# Patient Record
Sex: FEMALE | Race: WHITE | NOT HISPANIC OR LATINO | Employment: FULL TIME | URBAN - METROPOLITAN AREA
[De-identification: names, ages, dates, MRNs, and addresses within clinical notes are randomized per-mention and may not be internally consistent; named-entity substitution may affect disease eponyms.]

---

## 2024-02-07 ENCOUNTER — TRANSCRIBE ORDERS (OUTPATIENT)
Dept: OBSTETRICS AND GYNECOLOGY | Facility: HOSPITAL | Age: 34
End: 2024-02-07
Payer: COMMERCIAL

## 2024-02-07 DIAGNOSIS — Z34.90 PREGNANCY, UNSPECIFIED GESTATIONAL AGE: ICD-10-CM

## 2024-02-07 DIAGNOSIS — O34.31: Primary | ICD-10-CM

## 2024-02-07 DIAGNOSIS — Z87.59 HISTORY OF PRETERM PREMATURE RUPTURE OF MEMBRANES (PPROM): ICD-10-CM

## 2024-02-08 ENCOUNTER — OFFICE VISIT (OUTPATIENT)
Dept: OBSTETRICS AND GYNECOLOGY | Facility: HOSPITAL | Age: 34
End: 2024-02-08
Payer: COMMERCIAL

## 2024-02-08 ENCOUNTER — HOSPITAL ENCOUNTER (OUTPATIENT)
Dept: WOMENS IMAGING | Facility: HOSPITAL | Age: 34
Discharge: HOME | End: 2024-02-08
Admitting: OBSTETRICS & GYNECOLOGY
Payer: COMMERCIAL

## 2024-02-08 VITALS
HEIGHT: 67 IN | SYSTOLIC BLOOD PRESSURE: 136 MMHG | BODY MASS INDEX: 42.85 KG/M2 | DIASTOLIC BLOOD PRESSURE: 71 MMHG | WEIGHT: 273 LBS

## 2024-02-08 DIAGNOSIS — E72.12 METHYLENETETRAHYDROFOLATE REDUCTASE (MTHFR) DEFICIENCY AFFECTING PREGNANCY, ANTEPARTUM: ICD-10-CM

## 2024-02-08 DIAGNOSIS — O99.280 METHYLENETETRAHYDROFOLATE REDUCTASE (MTHFR) DEFICIENCY AFFECTING PREGNANCY, ANTEPARTUM: ICD-10-CM

## 2024-02-08 DIAGNOSIS — Z34.90 PREGNANCY, UNSPECIFIED GESTATIONAL AGE: ICD-10-CM

## 2024-02-08 DIAGNOSIS — O09.219 PREVIOUS PRETERM DELIVERY, ANTEPARTUM: Primary | ICD-10-CM

## 2024-02-08 DIAGNOSIS — Z87.59 HISTORY OF PRETERM PREMATURE RUPTURE OF MEMBRANES (PPROM): ICD-10-CM

## 2024-02-08 DIAGNOSIS — N96 RECURRENT PREGNANCY LOSS: ICD-10-CM

## 2024-02-08 DIAGNOSIS — O34.31: ICD-10-CM

## 2024-02-08 PROCEDURE — 76801 OB US < 14 WKS SINGLE FETUS: CPT

## 2024-02-08 PROCEDURE — 76813 OB US NUCHAL MEAS 1 GEST: CPT

## 2024-02-08 RX ORDER — PROGESTERONE 200 MG/1
200 CAPSULE ORAL DAILY
COMMUNITY

## 2024-02-08 RX ORDER — ONDANSETRON HYDROCHLORIDE 4 MG/5ML
4 SOLUTION ORAL ONCE AS NEEDED
COMMUNITY

## 2024-02-08 RX ORDER — ENOXAPARIN SODIUM 100 MG/ML
30 INJECTION SUBCUTANEOUS DAILY
COMMUNITY

## 2024-02-08 RX ORDER — CITALOPRAM 40 MG/1
40 TABLET ORAL DAILY
COMMUNITY

## 2024-02-08 RX ORDER — PRENATAL VIT/IRON FUM/FOLIC AC 27MG-0.8MG
TABLET ORAL DAILY
COMMUNITY

## 2024-02-08 NOTE — ASSESSMENT & PLAN NOTE
Patient reports that she has an MTHFR mutation heterozygote.  No records are available to confirm this.  She has been placed on prophylactic dose Lovenox for recurrent early pregnancy loss.  At the current time the date is unclear as to the necessity for anticoagulation in the face of MTHFR heterozygote's.  I would leave the continuation of this medication up to Dr. Olmstead and the patient.  Certainly if the patient gets in the third trimester with a normally grown fetus I would consider discontinuing the medication then.

## 2024-02-08 NOTE — LETTER
2024     Nelida Olmstead MD  [address]  [facility] [address]    Patient: Ninfa Jackson   YOB: 1990   Date of Visit: 2024     Dear Nelida Olmstead MD:       Thank you for referring Ninfa Jackson to me for evaluation. Below are the relevant portions of my assessment and plan of care.    If you have questions, please do not hesitate to call me. I look forward to following Ninfa along with you.         Sincerely,        Douglas A. Milligan, MD        CC: No Recipients    Milligan, Douglas A, MD  24 [phone]  Sign when Signing Visit  Documentation of the ultrasound findings, images, and interpretations will be available in the patient's Viewpoint report which is located in the imaging tab in chart review.    Maternal/Fetal Medicine Consult Note     Name: Ninfa Jackson    : 1990     MRN: [id number]     Referring Provider: Nelida Olmstead MD    Chief Complaint  cervical funneling    Subjective     History of Present Illness:  Ninfa Jackson is a 33 y.o.  13w3d who presents today for prior pregnancy losses    SHIRIN: Estimated Date of Delivery: 24     ROS:   As noted in HPI.     History reviewed. No pertinent past medical history.   Past Surgical History:   Procedure Laterality Date   •  SECTION PRIMARY      FTP   • D & C WITH SUCTION     • D & E SECOND TRIMESTER     • DEEP NECK LYMPH NODE BIOPSY / EXCISION     • WISDOM TOOTH EXTRACTION        OB History          6    Para   2    Term   0       2    AB   3    Living   1         SAB   3    IAB   0    Ectopic   0    Molar   0    Multiple   0    Live Births   1          Obstetric Comments   FOB #1 Pregnancy #1 SAB at 8 weeks  FOB #1 Pregnancy #2 P C/S at 34 6/7 weeks, FTP, male  FOB #1 Pregnancy #3 SAB at 7 weeks,  FOB #1 Pregnancy #4 PPROM at 18 weeks, unable to deliver all of fetus, decapitated D&E done  FOB #1 Pregnancy #5 D&E at 11 week s, Trisomy 15 after  "demise  FOB #1 Pregnancy #6  current                 Objective     Vital Signs  /71   Ht 170.2 cm (67\")   Wt 124 kg (273 lb)   LMP 10/28/2023 (Approximate)   Estimated body mass index is 42.76 kg/m² as calculated from the following:    Height as of this encounter: 170.2 cm (67\").    Weight as of this encounter: 124 kg (273 lb).    Physical Exam    Ultrasound Impression:   See Viewpoint    Assessment and Plan     Ninfa Jackson is a 33 y.o.  13w3d who presents today for prior pregnancy losses    Diagnoses and all orders for this visit:    1. Previous  delivery, antepartum (Primary)  Assessment & Plan:  Spontaneous  delivery in a previous pregnancy is well documented as placing the current pregnancy at risk for prematurity.  For example, women with one previous  birth are 2-4 times more likely to deliver another  infant and women with two previous  births have a four- to sixfold increased risk for delivering  compared to multiparous women with no previous  birth. Recent studies have provided evidence for the efficacy of vaginal progesterone suppositories to prevent  birth in women with a willams gestation and the history of a previous spontaneous  birth.  The effects of progesterone on the myometrium are twofold:      It suppresses the action of estrogen by inhibiting the replacement of cytosolic estrogen receptors.    It exerts a direct effect on the biosynthetic process of the uterus through its own cellular receptor.        Women who are candidates for this therapy should have progesterone supplementation initiated between 16 and 24 weeks' gestation and continued through 36 weeks' gestation.      Patient is currently on a 200 mg dose of progesterone daily.    Orders:  -     [facility]  [facility]; Future    2. 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  No records are available to confirm this.  She has been placed on prophylactic dose Lovenox for recurrent early pregnancy loss.  At the current time the date is unclear as to the necessity for anticoagulation in the face of MTHFR heterozygote's.  I would leave the continuation of this medication up to Dr. Olmstead and the patient.  Certainly if the patient gets in the third trimester with a normally grown fetus I would consider discontinuing the medication then.    Orders:  -     [facility]  Diagnostic Center; Future    3. Pregnancy, unspecified gestational age  -     [facility]  Diagnostic Center; Future    4. Recurrent pregnancy loss  Assessment & Plan:  Spontaneous  delivery in a previous pregnancy is well documented as placing subsequent pregnancies at risk for prematurity. For example, women with one previous  birth are 2-4 times more likely to deliver another  infant and women with two previous  births have a four- to sixfold increased risk for delivering  compared to multiparous women with no previous  birth.     Classically, the term “cervical insufficiency” is used to describe painless cervical dilation leading to recurrent 2nd trimester pregnancy losses/births of otherwise normal pregnancies. Although structural cervical weakness is the source of some 2nd trimester losses/births, most are caused by other disorders such as decidual inflammation/infection, hemorrhage or uterine overdistension. These disorders, which are not typically recurrent, can initiate biochemical changes in the cervix that lead to premature cervical shortening and, often, pregnancy loss.     A history-based diagnosis of cervical insufficiency is made for women with =2 consecutive prior 2nd trimester pregnancy losses associated with relatively painless early cervical dilatation or =3 early (<34 weeks)  births in  which other causes of pregnancy loss or  birth (e.g., infection, placental bleeding, multiple gestation,  labor) have been excluded. A diagnosis of cervical insufficiency based on history, ultrasound and physical exam, however, would include women with one or two prior 2nd trimester pregnancy losses or  births and cervical length <25 mm on transvaginal ultrasound examination or advanced cervical changes on physical examination before 24 weeks of gestation.     The majority of women with suspected cervical insufficiency do not meet criteria for a history-based diagnosis of cervical insufficiency. For these patients vaginal progesterone is indicated. The effects of progesterone on the myometrium are twofold: It suppresses the action of estrogen by inhibiting the replacement of cytosolic estrogen receptors and it exerts a direct effect on the biosynthetic process of the uterus through its own cellular receptor.     [place] is a 17-OHP preparation approved in 2011 by the [place] Food and Drug Administration (FDA) to reduce the risk of recurrent  birth in pregnant people with a willams pregnancy who have a history of a prior spontaneous  delivery.      Women who are candidates for progesterone supplementation should have therapy initiated between 16 and 24 weeks' gestation and continued through 36 weeks' gestation.    Cervical length screening is usually initiated at 14 weeks, but screening may be started as early as 12 weeks in women with early 2nd trimester losses, recurrent 2nd trimester losses or a prior large cold-knife conization. Ultrasound examination is generally repeated every two weeks until 22 weeks as long as the cervical length is =2.5mm,  with the expectation that  cervical changes will precede overt  labor or membrane rupture symptoms by 3-6 weeks (Iams 2011). Cervical cerclage placement would be offered for a cervical length that decreases to less than 2.5 mm  on serial cervical evaluation. Ultrasound screening is usually discontinued at 22 weeks of gestation, as cerclage is rarely performed after this time.    Orders:  -     [facility] [place]; Future         Follow Up  Return in about 4 weeks (around 3/7/2024).    I spent 30 minutes caring for the patient on the day of service. This included: obtaining or reviewing a separately obtained medical history, reviewing patient records, performing a medically appropriate exam and/or evaluation, counseling or educating the patient/family/caregiver, ordering medications, labs, and/or procedures and documenting such in the medical record. This does not include time spent on review and interpretation of other tests such as fetal ultrasound or the performance of other procedures such as amniocentesis or CVS.      Douglas A. Milligan, MD  Maternal Fetal Medicine, [facility] [place]     2024

## 2024-02-08 NOTE — PROGRESS NOTES
Patient denies bleeding, leaking fluid or cramping  NIPT negative  Patient is to schedule appointment with Dr. Olmstead's office for next week after this appointment.

## 2024-02-08 NOTE — ASSESSMENT & PLAN NOTE
Spontaneous  delivery in a previous pregnancy is well documented as placing subsequent pregnancies at risk for prematurity. For example, women with one previous  birth are 2-4 times more likely to deliver another  infant and women with two previous  births have a four- to sixfold increased risk for delivering  compared to multiparous women with no previous  birth.     Classically, the term “cervical insufficiency” is used to describe painless cervical dilation leading to recurrent 2nd trimester pregnancy losses/births of otherwise normal pregnancies. Although structural cervical weakness is the source of some 2nd trimester losses/births, most are caused by other disorders such as decidual inflammation/infection, hemorrhage or uterine overdistension. These disorders, which are not typically recurrent, can initiate biochemical changes in the cervix that lead to premature cervical shortening and, often, pregnancy loss.     A history-based diagnosis of cervical insufficiency is made for women with ?2 consecutive prior 2nd trimester pregnancy losses associated with relatively painless early cervical dilatation or ?3 early (<34 weeks)  births in which other causes of pregnancy loss or  birth (e.g., infection, placental bleeding, multiple gestation,  labor) have been excluded. A diagnosis of cervical insufficiency based on history, ultrasound and physical exam, however, would include women with one or two prior 2nd trimester pregnancy losses or  births and cervical length <25 mm on transvaginal ultrasound examination or advanced cervical changes on physical examination before 24 weeks of gestation.     The majority of women with suspected cervical insufficiency do not meet criteria for a history-based diagnosis of cervical insufficiency. For these patients vaginal progesterone is indicated. The effects of progesterone on the myometrium are twofold: It  suppresses the action of estrogen by inhibiting the replacement of cytosolic estrogen receptors and it exerts a direct effect on the biosynthetic process of the uterus through its own cellular receptor.     [place] is a 17-OHP preparation approved in 2011 by the US Food and Drug Administration (FDA) to reduce the risk of recurrent  birth in pregnant people with a willams pregnancy who have a history of a prior spontaneous  delivery.      Women who are candidates for progesterone supplementation should have therapy initiated between 16 and 24 weeks' gestation and continued through 36 weeks' gestation.    Cervical length screening is usually initiated at 14 weeks, but screening may be started as early as 12 weeks in women with early 2nd trimester losses, recurrent 2nd trimester losses or a prior large cold-knife conization. Ultrasound examination is generally repeated every two weeks until 22 weeks as long as the cervical length is ?2.5mm,  with the expectation that  cervical changes will precede overt  labor or membrane rupture symptoms by 3-6 weeks (Iams 2011). Cervical cerclage placement would be offered for a cervical length that decreases to less than 2.5 mm on serial cervical evaluation. Ultrasound screening is usually discontinued at 22 weeks of gestation, as cerclage is rarely performed after this time.

## 2024-02-08 NOTE — ASSESSMENT & PLAN NOTE
Spontaneous  delivery in a previous pregnancy is well documented as placing the current pregnancy at risk for prematurity.  For example, women with one previous  birth are 2-4 times more likely to deliver another  infant and women with two previous  births have a four- to sixfold increased risk for delivering  compared to multiparous women with no previous  birth. Recent studies have provided evidence for the efficacy of vaginal progesterone suppositories to prevent  birth in women with a willams gestation and the history of a previous spontaneous  birth.  The effects of progesterone on the myometrium are twofold:      It suppresses the action of estrogen by inhibiting the replacement of cytosolic estrogen receptors.    It exerts a direct effect on the biosynthetic process of the uterus through its own cellular receptor.        Women who are candidates for this therapy should have progesterone supplementation initiated between 16 and 24 weeks' gestation and continued through 36 weeks' gestation.      Patient is currently on a 200 mg dose of progesterone daily.

## 2024-03-07 ENCOUNTER — HOSPITAL ENCOUNTER (OUTPATIENT)
Dept: WOMENS IMAGING | Facility: HOSPITAL | Age: 34
Discharge: HOME | End: 2024-03-07
Admitting: OBSTETRICS & GYNECOLOGY
Payer: COMMERCIAL

## 2024-03-07 ENCOUNTER — OFFICE VISIT (OUTPATIENT)
Dept: OBSTETRICS AND GYNECOLOGY | Facility: HOSPITAL | Age: 34
End: 2024-03-07
Payer: COMMERCIAL

## 2024-03-07 VITALS — DIASTOLIC BLOOD PRESSURE: 70 MMHG | WEIGHT: 280 LBS | BODY MASS INDEX: 43.85 KG/M2 | SYSTOLIC BLOOD PRESSURE: 142 MMHG

## 2024-03-07 DIAGNOSIS — N96 RECURRENT PREGNANCY LOSS: ICD-10-CM

## 2024-03-07 DIAGNOSIS — E72.12 METHYLENETETRAHYDROFOLATE REDUCTASE (MTHFR) DEFICIENCY AFFECTING PREGNANCY, ANTEPARTUM: ICD-10-CM

## 2024-03-07 DIAGNOSIS — O44.40 LOW-LYING PLACENTA: ICD-10-CM

## 2024-03-07 DIAGNOSIS — O99.280 METHYLENETETRAHYDROFOLATE REDUCTASE (MTHFR) DEFICIENCY AFFECTING PREGNANCY, ANTEPARTUM: ICD-10-CM

## 2024-03-07 DIAGNOSIS — O09.219 PREVIOUS PRETERM DELIVERY, ANTEPARTUM: ICD-10-CM

## 2024-03-07 DIAGNOSIS — N96 RECURRENT PREGNANCY LOSS: Primary | ICD-10-CM

## 2024-03-07 DIAGNOSIS — Z34.90 PREGNANCY, UNSPECIFIED GESTATIONAL AGE: ICD-10-CM

## 2024-03-07 PROCEDURE — 76815 OB US LIMITED FETUS(S): CPT

## 2024-03-07 PROCEDURE — 76817 TRANSVAGINAL US OBSTETRIC: CPT

## 2024-03-07 NOTE — ASSESSMENT & PLAN NOTE
Patient returns today for cervical length screening.  She reports no complications since her last visit here.  She reports no regular uterine contractions.    Ultrasound today demonstrates a normally grown fetus we did not perform an anatomic survey today due to early gestation on transvaginal scanning the cervical length appears to be greater than 3 cm with no funneling.  The placenta was posterior low-lying.    We will rescan the patient again in 2 weeks to once again assess cervical length as well as to perform an anatomic survey.

## 2024-03-07 NOTE — PROGRESS NOTES
Pt has an appt with Dr. Olmstead on 3/21/24. NIPT negative. Pt denies vaginal bleeding or abdominal pain. Pt has history of a primary C/S at 34.6 weeks for failure to progress and a prior D&E at approximately 11 weeks (trisomy 15).

## 2024-03-07 NOTE — PROGRESS NOTES
"Documentation of the ultrasound findings, images, and interpretations will be available in the patient's Viewpoint report which is located in the imaging tab in chart review.    Maternal/Fetal Medicine Follow Up  Note     Name: Ninfa Jackson    : 1990     MRN: [id number]     Referring Provider: Nelida Olmstead MD    Chief Complaint  hx 18 wk loss-PROM, MO    Subjective     History of Present Illness:  Ninfa Jackson is a 33 y.o.  17w3d who presents today for prior PTD     SHIRIN: Estimated Date of Delivery: 24     ROS:   As noted in HPI.     Objective     Vital Signs  /70   Wt 127 kg (280 lb)   LMP 10/28/2023 (Approximate)   Estimated body mass index is 43.85 kg/m² as calculated from the following:    Height as of 24: 170.2 cm (67\").    Weight as of this encounter: 127 kg (280 lb).    Physical Exam    Ultrasound Impression:   See Viewpoint    Assessment and Plan     Ninfa Jackson is a 33 y.o.  17w3d who presents today for prior PTD    Diagnoses and all orders for this visit:    1. Recurrent pregnancy loss (Primary)    2. Previous  delivery, antepartum  Assessment & Plan:  Patient returns today for cervical length screening.  She reports no complications since her last visit here.  She reports no regular uterine contractions.    Ultrasound today demonstrates a normally grown fetus we did not perform an anatomic survey today due to early gestation on transvaginal scanning the cervical length appears to be greater than 3 cm with no funneling.  The placenta was posterior low-lying.    We will rescan the patient again in 2 weeks to once again assess cervical length as well as to perform an anatomic survey.      3. Pregnancy, unspecified gestational age    4. Low-lying placenta         Follow Up  Return in about 2 weeks (around 3/21/2024).    I spent 10 minutes caring for the patient on the day of service. This included: obtaining or reviewing a separately obtained medical " history, reviewing patient records, performing a medically appropriate exam and/or evaluation, counseling or educating the patient/family/caregiver, ordering medications, labs, and/or procedures and documenting such in the medical record. This does not include time spent on review and interpretation of other tests such as fetal ultrasound or the performance of other procedures such as amniocentesis or CVS.      Douglas A. Milligan, MD  Maternal Fetal Medicine, [facility]     2024

## 2024-03-25 ENCOUNTER — HOSPITAL ENCOUNTER (INPATIENT)
Facility: HOSPITAL | Age: 34
LOS: 4 days | Discharge: HOME | End: 2024-03-29
Attending: OBSTETRICS & GYNECOLOGY | Admitting: OBSTETRICS & GYNECOLOGY
Payer: COMMERCIAL

## 2024-03-25 PROBLEM — O34.32 INCOMPETENT CERVIX DURING SECOND TRIMESTER, ANTEPARTUM: Status: ACTIVE | Noted: 2024-03-25

## 2024-03-25 PROBLEM — Z98.891 H/O: C-SECTION: Status: ACTIVE | Noted: 2024-03-25

## 2024-03-25 PROBLEM — E66.01 MORBID OBESITY WITH BMI OF 40.0-44.9, ADULT: Status: ACTIVE | Noted: 2024-03-25

## 2024-03-25 LAB
ABO GROUP BLD: NORMAL
ABO GROUP BLD: NORMAL
ALP SERPL-CCNC: 109 U/L (ref 39–117)
ALT SERPL W P-5'-P-CCNC: 20 U/L (ref 1–33)
AST SERPL-CCNC: 49 U/L (ref 1–32)
BASOPHILS # BLD AUTO: 0.03 10*3/MM3 (ref 0–0.2)
BASOPHILS NFR BLD AUTO: 0.3 % (ref 0–1.5)
BILIRUB SERPL-MCNC: <0.2 MG/DL (ref 0–1.2)
BILIRUB UR QL STRIP: NEGATIVE
BLD GP AB SCN SERPL QL: NEGATIVE
CLARITY UR: CLEAR
COLOR UR: YELLOW
CREAT SERPL-MCNC: 0.5 MG/DL (ref 0.57–1)
DEPRECATED RDW RBC AUTO: 42.4 FL (ref 37–54)
EOSINOPHIL # BLD AUTO: 0.04 10*3/MM3 (ref 0–0.4)
EOSINOPHIL NFR BLD AUTO: 0.4 % (ref 0.3–6.2)
ERYTHROCYTE [DISTWIDTH] IN BLOOD BY AUTOMATED COUNT: 13 % (ref 12.3–15.4)
GLUCOSE UR STRIP-MCNC: NEGATIVE MG/DL
HCT VFR BLD AUTO: 38.5 % (ref 34–46.6)
HGB BLD-MCNC: 12.9 G/DL (ref 12–15.9)
HGB UR QL STRIP.AUTO: NEGATIVE
IMM GRANULOCYTES # BLD AUTO: 0.12 10*3/MM3 (ref 0–0.05)
IMM GRANULOCYTES NFR BLD AUTO: 1.3 % (ref 0–0.5)
KETONES UR QL STRIP: NEGATIVE
LDH SERPL-CCNC: 387 U/L (ref 135–214)
LEUKOCYTE ESTERASE UR QL STRIP.AUTO: NEGATIVE
LYMPHOCYTES # BLD AUTO: 1.82 10*3/MM3 (ref 0.7–3.1)
LYMPHOCYTES NFR BLD AUTO: 19.2 % (ref 19.6–45.3)
MCH RBC QN AUTO: 29.6 PG (ref 26.6–33)
MCHC RBC AUTO-ENTMCNC: 33.5 G/DL (ref 31.5–35.7)
MCV RBC AUTO: 88.3 FL (ref 79–97)
MONOCYTES # BLD AUTO: 0.75 10*3/MM3 (ref 0.1–0.9)
MONOCYTES NFR BLD AUTO: 7.9 % (ref 5–12)
NEUTROPHILS NFR BLD AUTO: 6.72 10*3/MM3 (ref 1.7–7)
NEUTROPHILS NFR BLD AUTO: 70.9 % (ref 42.7–76)
NITRITE UR QL STRIP: NEGATIVE
NRBC BLD AUTO-RTO: 0 /100 WBC (ref 0–0.2)
PH UR STRIP.AUTO: 6 [PH] (ref 5–8)
PLATELET # BLD AUTO: 363 10*3/MM3 (ref 140–450)
PMV BLD AUTO: 10 FL (ref 6–12)
PROT UR QL STRIP: NEGATIVE
RBC # BLD AUTO: 4.36 10*6/MM3 (ref 3.77–5.28)
RH BLD: POSITIVE
RH BLD: POSITIVE
SP GR UR STRIP: 1.01 (ref 1–1.03)
T PALLIDUM IGG SER QL: NORMAL
T&S EXPIRATION DATE: NORMAL
URATE SERPL-MCNC: 4.2 MG/DL (ref 2.4–5.7)
UROBILINOGEN UR QL STRIP: NORMAL
WBC NRBC COR # BLD AUTO: 9.48 10*3/MM3 (ref 3.4–10.8)

## 2024-03-25 PROCEDURE — 86901 BLOOD TYPING SEROLOGIC RH(D): CPT | Performed by: OBSTETRICS & GYNECOLOGY

## 2024-03-25 PROCEDURE — 25010000002 CEFAZOLIN PER 500 MG: Performed by: OBSTETRICS & GYNECOLOGY

## 2024-03-25 PROCEDURE — 84550 ASSAY OF BLOOD/URIC ACID: CPT | Performed by: OBSTETRICS & GYNECOLOGY

## 2024-03-25 PROCEDURE — 25810000003 LACTATED RINGERS PER 1000 ML: Performed by: OBSTETRICS & GYNECOLOGY

## 2024-03-25 PROCEDURE — 84460 ALANINE AMINO (ALT) (SGPT): CPT | Performed by: OBSTETRICS & GYNECOLOGY

## 2024-03-25 PROCEDURE — 25010000002 CLINDAMYCIN 900 MG/50ML SOLUTION: Performed by: OBSTETRICS & GYNECOLOGY

## 2024-03-25 PROCEDURE — 86901 BLOOD TYPING SEROLOGIC RH(D): CPT

## 2024-03-25 PROCEDURE — 82247 BILIRUBIN TOTAL: CPT | Performed by: OBSTETRICS & GYNECOLOGY

## 2024-03-25 PROCEDURE — 25810000003 LACTATED RINGERS SOLUTION: Performed by: OBSTETRICS & GYNECOLOGY

## 2024-03-25 PROCEDURE — 25010000002 GENTAMICIN PER 80 MG: Performed by: OBSTETRICS & GYNECOLOGY

## 2024-03-25 PROCEDURE — 85025 COMPLETE CBC W/AUTO DIFF WBC: CPT | Performed by: OBSTETRICS & GYNECOLOGY

## 2024-03-25 PROCEDURE — 83615 LACTATE (LD) (LDH) ENZYME: CPT | Performed by: OBSTETRICS & GYNECOLOGY

## 2024-03-25 PROCEDURE — 86900 BLOOD TYPING SEROLOGIC ABO: CPT

## 2024-03-25 PROCEDURE — 86900 BLOOD TYPING SEROLOGIC ABO: CPT | Performed by: OBSTETRICS & GYNECOLOGY

## 2024-03-25 PROCEDURE — 84450 TRANSFERASE (AST) (SGOT): CPT | Performed by: OBSTETRICS & GYNECOLOGY

## 2024-03-25 PROCEDURE — 86780 TREPONEMA PALLIDUM: CPT | Performed by: OBSTETRICS & GYNECOLOGY

## 2024-03-25 PROCEDURE — 84075 ASSAY ALKALINE PHOSPHATASE: CPT | Performed by: OBSTETRICS & GYNECOLOGY

## 2024-03-25 PROCEDURE — 86850 RBC ANTIBODY SCREEN: CPT | Performed by: OBSTETRICS & GYNECOLOGY

## 2024-03-25 PROCEDURE — 81003 URINALYSIS AUTO W/O SCOPE: CPT | Performed by: OBSTETRICS & GYNECOLOGY

## 2024-03-25 PROCEDURE — 82565 ASSAY OF CREATININE: CPT | Performed by: OBSTETRICS & GYNECOLOGY

## 2024-03-25 RX ORDER — CLINDAMYCIN PHOSPHATE 900 MG/50ML
900 INJECTION, SOLUTION INTRAVENOUS EVERY 8 HOURS
Qty: 300 ML | Refills: 0 | Status: COMPLETED | OUTPATIENT
Start: 2024-03-25 | End: 2024-03-27

## 2024-03-25 RX ORDER — SODIUM CHLORIDE 0.9 % (FLUSH) 0.9 %
10 SYRINGE (ML) INJECTION AS NEEDED
Status: DISCONTINUED | OUTPATIENT
Start: 2024-03-25 | End: 2024-03-29 | Stop reason: HOSPADM

## 2024-03-25 RX ORDER — ACETAMINOPHEN 325 MG/1
650 TABLET ORAL EVERY 4 HOURS PRN
Status: DISCONTINUED | OUTPATIENT
Start: 2024-03-25 | End: 2024-03-29 | Stop reason: HOSPADM

## 2024-03-25 RX ORDER — SODIUM CHLORIDE, SODIUM LACTATE, POTASSIUM CHLORIDE, CALCIUM CHLORIDE 600; 310; 30; 20 MG/100ML; MG/100ML; MG/100ML; MG/100ML
125 INJECTION, SOLUTION INTRAVENOUS CONTINUOUS
Status: DISCONTINUED | OUTPATIENT
Start: 2024-03-25 | End: 2024-03-29

## 2024-03-25 RX ORDER — BISACODYL 10 MG
10 SUPPOSITORY, RECTAL RECTAL DAILY PRN
Status: DISCONTINUED | OUTPATIENT
Start: 2024-03-25 | End: 2024-03-29 | Stop reason: HOSPADM

## 2024-03-25 RX ORDER — SODIUM CHLORIDE 9 MG/ML
40 INJECTION, SOLUTION INTRAVENOUS AS NEEDED
Status: DISCONTINUED | OUTPATIENT
Start: 2024-03-25 | End: 2024-03-29 | Stop reason: HOSPADM

## 2024-03-25 RX ORDER — GENTAMICIN SULFATE 60 MG/50ML
120 INJECTION, SOLUTION INTRAVENOUS EVERY 8 HOURS
Qty: 600 ML | Refills: 0 | Status: COMPLETED | OUTPATIENT
Start: 2024-03-25 | End: 2024-03-27

## 2024-03-25 RX ORDER — INDOMETHACIN 25 MG/1
25 CAPSULE ORAL EVERY 4 HOURS
Status: COMPLETED | OUTPATIENT
Start: 2024-03-25 | End: 2024-03-29

## 2024-03-25 RX ORDER — DOCUSATE SODIUM 100 MG/1
100 CAPSULE, LIQUID FILLED ORAL 2 TIMES DAILY PRN
Status: DISCONTINUED | OUTPATIENT
Start: 2024-03-25 | End: 2024-03-29 | Stop reason: HOSPADM

## 2024-03-25 RX ORDER — LIDOCAINE HYDROCHLORIDE 10 MG/ML
0.5 INJECTION, SOLUTION EPIDURAL; INFILTRATION; INTRACAUDAL; PERINEURAL ONCE AS NEEDED
Status: DISCONTINUED | OUTPATIENT
Start: 2024-03-25 | End: 2024-03-29 | Stop reason: HOSPADM

## 2024-03-25 RX ORDER — INDOMETHACIN 25 MG/1
50 CAPSULE ORAL ONCE
Qty: 2 CAPSULE | Refills: 0 | Status: COMPLETED | OUTPATIENT
Start: 2024-03-25 | End: 2024-03-25

## 2024-03-25 RX ORDER — SODIUM CHLORIDE 0.9 % (FLUSH) 0.9 %
10 SYRINGE (ML) INJECTION EVERY 12 HOURS SCHEDULED
Status: DISCONTINUED | OUTPATIENT
Start: 2024-03-25 | End: 2024-03-29 | Stop reason: HOSPADM

## 2024-03-25 RX ORDER — ONDANSETRON 2 MG/ML
4 INJECTION INTRAMUSCULAR; INTRAVENOUS EVERY 8 HOURS PRN
Status: DISCONTINUED | OUTPATIENT
Start: 2024-03-25 | End: 2024-03-29 | Stop reason: HOSPADM

## 2024-03-25 RX ORDER — ONDANSETRON 4 MG/1
8 TABLET, ORALLY DISINTEGRATING ORAL EVERY 8 HOURS PRN
Status: DISCONTINUED | OUTPATIENT
Start: 2024-03-25 | End: 2024-03-29 | Stop reason: HOSPADM

## 2024-03-25 RX ADMIN — GENTAMICIN SULFATE 120 MG: 60 INJECTION, SOLUTION INTRAVENOUS at 18:21

## 2024-03-25 RX ADMIN — SODIUM CHLORIDE, POTASSIUM CHLORIDE, SODIUM LACTATE AND CALCIUM CHLORIDE 125 ML/HR: 600; 310; 30; 20 INJECTION, SOLUTION INTRAVENOUS at 22:08

## 2024-03-25 RX ADMIN — CEFAZOLIN 1000 MG: 1 INJECTION, POWDER, FOR SOLUTION INTRAMUSCULAR; INTRAVENOUS at 19:56

## 2024-03-25 RX ADMIN — SODIUM CHLORIDE, POTASSIUM CHLORIDE, SODIUM LACTATE AND CALCIUM CHLORIDE 500 ML: 600; 310; 30; 20 INJECTION, SOLUTION INTRAVENOUS at 16:45

## 2024-03-25 RX ADMIN — CLINDAMYCIN PHOSPHATE 900 MG: 900 INJECTION, SOLUTION INTRAVENOUS at 17:47

## 2024-03-25 RX ADMIN — INDOMETHACIN 50 MG: 25 CAPSULE ORAL at 16:15

## 2024-03-25 RX ADMIN — INDOMETHACIN 25 MG: 25 CAPSULE ORAL at 19:56

## 2024-03-25 NOTE — H&P
"[facility]  Obstetric History and Physical    Referring Provider: Nelida Olmstead MD      Cc: Short cervical length    Subjective     Patient is a 33 y.o. female  currently at 20w0d, who presents as a transfer from [facility] for evaluation of possible incompetent cervix.  Presented to primary OB today with complaint of  back pain and menstrual cramping.  Ultrasound revealed short cervix with funneling.  Cervical exam noted be 1 cm per Dr. Olmstead.  Patient denies leaking of fluid, vaginal bleeding, recent trauma, urinary symptoms, fever, any other concerns.  Most recent PDC ultrasound at 18 weeks  revealed normal cervical length.  Obstetrical history significant for pre-PROM at 18 weeks requiring a D&E after failed spontaneous , primary  34 weeks 6 days due to failure to progress, and a first trimester D&E 11 weeks due to aneuploidy.  Patient currently denies abdominal pain any other concerns.        The following portions of the patients history were reviewed and updated as appropriate: current medications, allergies, past medical history, past surgical history, past family history, past social history, and problem list .       Prenatal Information:   Maternal Prenatal Labs  Blood Type No results found for: \"ABO\"   Rh Status No results found for: \"RH\"   Antibody Screen No results found for: \"ABSCRN\"   Gonnorhea No results found for: \"GCCX\"   Chlamydia No results found for: \"CLAMYDCU\"   RPR No results found for: \"RPR\"   Syphilis Antibody No results found for: \"SYPHILIS\"   Rubella No results found for: \"RUBELLAIGGIN\"   Hepatitis B Surface Antigen No results found for: \"HEPBSAG\"   HIV-1 Antibody No results found for: \"LABHIV1\"   Hepatitis C Antibody No results found for: \"HEPCAB\"   Rapid Urin Drug Screen No results found for: \"AMPMETHU\", \"BARBITSCNUR\", \"LABBENZSCN\", \"LABMETHSCN\", \"LABOPIASCN\", \"THCURSCR\", \"COCAINEUR\", \"AMPHETSCREEN\", \"PROPOXSCN\", \"BUPRENORSCNU\", \"METAMPSCNUR\", " "\"OXYCODONESCN\", \"TRICYCLICSCN\"   Group B Strep Culture No results found for: \"GBSANTIGEN\"           External Prenatal Results       Pregnancy Outside Results - Transcribed From Office Records - See Scanned Records For Details       Test Value Date Time    ABO       Rh       Antibody Screen ^ NEG  20 1346    Varicella IgG       Rubella ^ POSITIVE  20 1346    Hgb ^ 13.6 g/dL 22 1656    Hct ^ 41.8 % 22 1656    Glucose Fasting GTT       Glucose Tolerance Test 1 hour       Glucose Tolerance Test 3 hour       Gonorrhea (discrete)       Chlamydia (discrete)       RPR ^ Non-Reactive  20 1346    VDRL       Syphilis Antibody       HBsAg ^ NEGATIVE  20 1346    Herpes Simplex Virus PCR       Herpes Simplex VIrus Culture       HIV       Hep C RNA Quant PCR       Hep C Antibody       AFP       Group B Strep       GBS Susceptibility to Clindamycin       GBS Susceptibility to Erythromycin       Fetal Fibronectin       Genetic Testing, Maternal Blood                 Drug Screening       Test Value Date Time    Urine Drug Screen       Amphetamine Screen       Barbiturate Screen       Benzodiazepine Screen       Methadone Screen       Phencyclidine Screen       Opiates Screen       THC Screen       Cocaine Screen       Propoxyphene Screen       Buprenorphine Screen       Methamphetamine Screen       Oxycodone Screen       Tricyclic Antidepressants Screen                 Legend    ^: Historical                              Past OB History:       OB History    Para Term  AB Living   6 2 0 2 3 1   SAB IAB Ectopic Molar Multiple Live Births   3 0 0 0 0 1      # Outcome Date GA Lbr Kris/2nd Weight Sex Type Anes PTL Lv   6 Current            5 SAB 2023 11w0d   M SAB      4  20 18w2d   F    FD   3 SAB 2019 7w0d    SAB      2  18 34w6d   M CS-Unspec EPI  ADY      Complications: Failure to Progress in Second Stage   1 SAB 2017 8w0d    SAB         Obstetric " Comments   FOB #1 Pregnancy #1 SAB at 8 weeks   FOB #1 Pregnancy #2 P C/S at 34 6/7 weeks, FTP, male   FOB #1 Pregnancy #3 SAB at 7 weeks,   FOB #1 Pregnancy #4 PPROM at 18 weeks, unable to deliver all of fetus, decapitated D&E done   FOB #1 Pregnancy #5 D&E at 11 weeks, Trisomy 15 after demise   FOB #1 Pregnancy #6  current       Past Medical History: Past Medical History:   Diagnosis Date   • Anxiety    • Depression    • Hyperemesis gravidarum       Past Surgical History Past Surgical History:   Procedure Laterality Date   •  SECTION PRIMARY  2018    FTP   • D & C WITH SUCTION     • D & E SECOND TRIMESTER     • DEEP NECK LYMPH NODE BIOPSY / EXCISION     • WISDOM TOOTH EXTRACTION        Family History: No family history on file.   Social History:  reports that she has never smoked. She has never used smokeless tobacco.   reports that she does not currently use alcohol.   reports no history of drug use.                   General ROS Negative Findings:Headaches, Visual Changes, Epigastric pain, Anorexia, Nausia/Vomiting, ROM, and Vaginal Bleeding    ROS     All other systems have been reviewed and are neg  Objective       Vital Signs Range for the last 24 hours  Temperature: Temp:  [98.6 °F (37 °C)] 98.6 °F (37 °C)   Temp Source: Temp src: Oral   BP: BP: (131)/(79) 131/79   Pulse: Heart Rate:  [90] 90   Respirations: Resp:  [18] 18   SPO2: SpO2:  [97 %] 97 %   O2 Amount (l/min):     O2 Devices     Weight: Weight:  [127 kg (280 lb)] 127 kg (280 lb)     Physical Examination:   General:   alert, appears stated age, and cooperative   Skin:   normal   HEENT:     Lungs:   clear to auscultation bilaterally   Heart:   regular rate and rhythm, S1, S2 normal, no murmur, click, rub or gallop   Gastrointestinal: Abdomen soft, gravid uterus nontender, no guarding, no rebound negative CVA tenderness.   Lower Extremities: No edema, no calf tenderness   : Exam deferred.   Musculoskeletal:     Neuro: No  focal deficits noted.             Fetal Heart Rate Assessment   Method:     Beats/min:     Baseline:     Varibility:     Accels:     Decels:     Tracing Category:       Uterine Assessment   Method:     Frequency (min):     Ctx Count in 10 min:     Duration:     Intensity:     Intensity by IUPC:     Resting Tone:     Resting Tone by IUPC:     [place] Units:       Laboratory Results:   Lab Results (last 24 hours)       Procedure Component Value Units Date/Time    T Pallidum Antibody w/ reflex RPR (Syphilis) [[id number]] Collected: 24    Specimen: Blood Updated: 24    Preeclampsia Panel [[id number]] Collected: 24    Specimen: Blood Updated: 24    CBC & Differential [[id number]] Collected: 24    Specimen: Blood Updated: 24    Narrative:      The following orders were created for panel order CBC & Differential.  Procedure                               Abnormality         Status                     ---------                               -----------         ------                     CBC Auto Differential[[id number]]                            In process                   Please view results for these tests on the individual orders.    CBC Auto Differential [[id number]] Collected: 24    Specimen: Blood Updated: 24          Radiology Review:   Imaging Results (Last 24 Hours)       ** No results found for the last 24 hours. 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  Intrauterine pregnancy at 20w0d weeks gestation with reassuring fetal status.    2.  Incompetent cervix versus  labor  3.  No evidence of acute infection.  4.   History of  x 1 at 34 weeks 6 days  5.  History of D&E 11 and 18 weeks due to aneuploidy and failed spontaneous AB at 18 weeks gestation  6.  MTHFR heterozygous  7.  Obesity BMI 43.85  8.  AST slightly elevated at 49 most likely DUNNE  Plan:  1.  Admit to antepartum unit, continuous toco, Indocin tocolysis, antibiotics, n.p.o. after midnight, PDC ultrasound in a.m. possible cerclage  2. Plan of care has been reviewed with patient.  3.  Risks, benefits of treatment plan have been discussed.  4.  All questions have been answered.  5      Aung Rajan, DO  3/25/2024  16:57 EDT

## 2024-03-25 NOTE — PLAN OF CARE
Problem: Adult Inpatient Plan of Care  Goal: Plan of Care Review  Outcome: Ongoing, Progressing  Flowsheets (Taken 3/25/2024 1727)  Progress: improving  Plan of Care Reviewed With:   patient   spouse   Goal Outcome Evaluation:  Plan of Care Reviewed With: patient, spouse        Progress: improving       Patient denies bleeding, leakage of fluid but does report intermittent cramping and low back pain since Saturday.  FHR check done with  bpm.

## 2024-03-25 NOTE — LETTER
March 29, 2024     Patient: Ninfa Jackson   YOB: 1990   Date of Visit: 3/25/2024       To Whom It May Concern:    It is my medical opinion that Ninfa Jackson may return to work per Yaritza Echevarria MD/MFNIRMAL beginning on Monday April 1st.            Sincerely,    Sabrina Short RN

## 2024-03-26 ENCOUNTER — APPOINTMENT (OUTPATIENT)
Dept: WOMENS IMAGING | Facility: HOSPITAL | Age: 34
End: 2024-03-26
Payer: COMMERCIAL

## 2024-03-26 ENCOUNTER — ANESTHESIA EVENT (OUTPATIENT)
Dept: LABOR AND DELIVERY | Facility: HOSPITAL | Age: 34
End: 2024-03-26
Payer: COMMERCIAL

## 2024-03-26 ENCOUNTER — ANESTHESIA (OUTPATIENT)
Dept: LABOR AND DELIVERY | Facility: HOSPITAL | Age: 34
End: 2024-03-26
Payer: COMMERCIAL

## 2024-03-26 PROCEDURE — 25810000003 LACTATED RINGERS PER 1000 ML: Performed by: OBSTETRICS & GYNECOLOGY

## 2024-03-26 PROCEDURE — 76817 TRANSVAGINAL US OBSTETRIC: CPT

## 2024-03-26 PROCEDURE — 25010000002 CLINDAMYCIN 900 MG/50ML SOLUTION: Performed by: OBSTETRICS & GYNECOLOGY

## 2024-03-26 PROCEDURE — 25010000002 GENTAMICIN PER 80 MG: Performed by: OBSTETRICS & GYNECOLOGY

## 2024-03-26 PROCEDURE — 99233 SBSQ HOSP IP/OBS HIGH 50: CPT | Performed by: OBSTETRICS & GYNECOLOGY

## 2024-03-26 PROCEDURE — 76811 OB US DETAILED SNGL FETUS: CPT

## 2024-03-26 PROCEDURE — 76811 OB US DETAILED SNGL FETUS: CPT | Performed by: OBSTETRICS & GYNECOLOGY

## 2024-03-26 PROCEDURE — 76817 TRANSVAGINAL US OBSTETRIC: CPT | Performed by: OBSTETRICS & GYNECOLOGY

## 2024-03-26 PROCEDURE — 25010000002 CEFAZOLIN PER 500 MG: Performed by: OBSTETRICS & GYNECOLOGY

## 2024-03-26 RX ORDER — CITALOPRAM 40 MG/1
40 TABLET ORAL DAILY
Status: DISCONTINUED | OUTPATIENT
Start: 2024-03-26 | End: 2024-03-26

## 2024-03-26 RX ORDER — CITALOPRAM 40 MG/1
40 TABLET ORAL NIGHTLY
Status: DISCONTINUED | OUTPATIENT
Start: 2024-03-26 | End: 2024-03-29 | Stop reason: HOSPADM

## 2024-03-26 RX ORDER — PRENATAL VIT/IRON FUM/FOLIC AC 27MG-0.8MG
1 TABLET ORAL DAILY
Status: DISCONTINUED | OUTPATIENT
Start: 2024-03-26 | End: 2024-03-26

## 2024-03-26 RX ORDER — PRENATAL VIT/IRON FUM/FOLIC AC 27MG-0.8MG
1 TABLET ORAL NIGHTLY
Status: DISCONTINUED | OUTPATIENT
Start: 2024-03-26 | End: 2024-03-29 | Stop reason: HOSPADM

## 2024-03-26 RX ADMIN — CEFAZOLIN 1000 MG: 1 INJECTION, POWDER, FOR SOLUTION INTRAMUSCULAR; INTRAVENOUS at 20:54

## 2024-03-26 RX ADMIN — CEFAZOLIN 1000 MG: 1 INJECTION, POWDER, FOR SOLUTION INTRAMUSCULAR; INTRAVENOUS at 05:27

## 2024-03-26 RX ADMIN — GENTAMICIN SULFATE 120 MG: 60 INJECTION, SOLUTION INTRAVENOUS at 18:35

## 2024-03-26 RX ADMIN — CLINDAMYCIN PHOSPHATE 900 MG: 900 INJECTION, SOLUTION INTRAVENOUS at 17:41

## 2024-03-26 RX ADMIN — PRENATAL VITAMINS-IRON FUMARATE 27 MG IRON-FOLIC ACID 0.8 MG TABLET 1 TABLET: at 20:54

## 2024-03-26 RX ADMIN — Medication 5000 UNITS: at 20:54

## 2024-03-26 RX ADMIN — INDOMETHACIN 25 MG: 25 CAPSULE ORAL at 20:54

## 2024-03-26 RX ADMIN — CITALOPRAM HYDROBROMIDE 40 MG: 40 TABLET ORAL at 20:54

## 2024-03-26 RX ADMIN — INDOMETHACIN 25 MG: 25 CAPSULE ORAL at 12:23

## 2024-03-26 RX ADMIN — INDOMETHACIN 25 MG: 25 CAPSULE ORAL at 05:27

## 2024-03-26 RX ADMIN — SODIUM CHLORIDE, POTASSIUM CHLORIDE, SODIUM LACTATE AND CALCIUM CHLORIDE 125 ML/HR: 600; 310; 30; 20 INJECTION, SOLUTION INTRAVENOUS at 20:54

## 2024-03-26 RX ADMIN — GENTAMICIN SULFATE 120 MG: 60 INJECTION, SOLUTION INTRAVENOUS at 02:11

## 2024-03-26 RX ADMIN — CLINDAMYCIN PHOSPHATE 900 MG: 900 INJECTION, SOLUTION INTRAVENOUS at 02:11

## 2024-03-26 RX ADMIN — Medication 10 ML: at 20:55

## 2024-03-26 RX ADMIN — GENTAMICIN SULFATE 120 MG: 60 INJECTION, SOLUTION INTRAVENOUS at 10:34

## 2024-03-26 RX ADMIN — INDOMETHACIN 25 MG: 25 CAPSULE ORAL at 00:16

## 2024-03-26 RX ADMIN — INDOMETHACIN 25 MG: 25 CAPSULE ORAL at 08:45

## 2024-03-26 RX ADMIN — CLINDAMYCIN PHOSPHATE 900 MG: 900 INJECTION, SOLUTION INTRAVENOUS at 09:49

## 2024-03-26 RX ADMIN — INDOMETHACIN 25 MG: 25 CAPSULE ORAL at 17:41

## 2024-03-26 RX ADMIN — CEFAZOLIN 1000 MG: 1 INJECTION, POWDER, FOR SOLUTION INTRAMUSCULAR; INTRAVENOUS at 12:23

## 2024-03-26 RX ADMIN — SODIUM CHLORIDE, POTASSIUM CHLORIDE, SODIUM LACTATE AND CALCIUM CHLORIDE 125 ML/HR: 600; 310; 30; 20 INJECTION, SOLUTION INTRAVENOUS at 08:45

## 2024-03-26 NOTE — PLAN OF CARE
Problem: Adult Inpatient Plan of Care  Goal: Plan of Care Review  Outcome: Ongoing, Progressing  Flowsheets (Taken 3/26/2024 1445)  Progress: improving  Plan of Care Reviewed With:   patient   spouse  Goal: Patient-Specific Goal (Individualized)  Outcome: Ongoing, Progressing  Goal: Absence of Hospital-Acquired Illness or Injury  Outcome: Ongoing, Progressing  Intervention: Identify and Manage Fall Risk  Description: Perform standard risk assessment on admission using a validated tool or comprehensive approach appropriate to the patient; reassess fall risk frequently, with change in status or transfer to another level of care.  Communicate fall injury risk to interprofessional healthcare team.  Determine need for increased observation, equipment and environmental modification, such as low bed, signage and supportive, nonskid footwear.  Adjust safety measures to individual developmental age, stage and identified risk factors.  Reinforce the importance of safety and physical activity with patient and family.  Perform regular intentional rounding to assess need for position change, pain assessment and personal needs, including assistance with toileting.  Recent Flowsheet Documentation  Taken 3/26/2024 1442 by Sabrina hSort, RN  Safety Promotion/Fall Prevention:   safety round/check completed   clutter free environment maintained   assistive device/personal items within reach   fall prevention program maintained   nonskid shoes/slippers when out of bed  Taken 3/26/2024 1344 by Sabrina Short, RN  Safety Promotion/Fall Prevention:   safety round/check completed   assistive device/personal items within reach   clutter free environment maintained   fall prevention program maintained   nonskid shoes/slippers when out of bed  Taken 3/26/2024 1223 by Sabrina Short, RN  Safety Promotion/Fall Prevention:   safety round/check completed   clutter free environment maintained   assistive device/personal items within reach    fall prevention program maintained   nonskid shoes/slippers when out of bed  Taken 3/26/2024 1125 by Sabrina Short RN  Safety Promotion/Fall Prevention:   safety round/check completed   assistive device/personal items within reach   clutter free environment maintained   fall prevention program maintained   nonskid shoes/slippers when out of bed  Taken 3/26/2024 1030 by Sabrina Short RN  Safety Promotion/Fall Prevention:   safety round/check completed   clutter free environment maintained   assistive device/personal items within reach   fall prevention program maintained   nonskid shoes/slippers when out of bed  Taken 3/26/2024 0945 by Sabrina Short RN  Safety Promotion/Fall Prevention:   safety round/check completed   assistive device/personal items within reach   clutter free environment maintained   fall prevention program maintained   nonskid shoes/slippers when out of bed  Taken 3/26/2024 0840 by Sabrina Short RN  Safety Promotion/Fall Prevention:   safety round/check completed   assistive device/personal items within reach   clutter free environment maintained   fall prevention program maintained   nonskid shoes/slippers when out of bed  Taken 3/26/2024 0748 by Sabrina Short RN  Safety Promotion/Fall Prevention:   safety round/check completed   clutter free environment maintained   assistive device/personal items within reach   fall prevention program maintained   nonskid shoes/slippers when out of bed  Intervention: Prevent Skin Injury  Description: Perform a screening for skin injury risk, such as pressure or moisture associated skin damage on admission and at regular intervals throughout hospital stay.  Keep all areas of skin (especially folds) clean and dry.  Maintain adequate skin hydration.  Relieve and redistribute pressure and protect bony prominences; implement measures based on patient-specific risk factors.  Match turning and repositioning schedule to clinical condition.  Encourage weight  shift frequently; assist with reposition if unable to complete independently.  Float heels off bed; avoid pressure on the Achilles tendon.  Keep skin free from extended contact with medical devices.  Encourage functional activity and mobility, as early as tolerated.  Use aids (e.g., slide boards, mechanical lift) during transfer.  Recent Flowsheet Documentation  Taken 3/26/2024 0748 by Sabrina Short RN  Body Position: supine  Skin Protection:   adhesive use limited   transparent dressing maintained  Intervention: Prevent and Manage VTE (Venous Thromboembolism) Risk  Description: Assess for VTE (venous thromboembolism) risk.  Encourage and assist with early ambulation.  Initiate and maintain compression or other therapy, as indicated, based on identified risk in accordance with organizational protocol and provider order.  Encourage both active and passive leg exercises while in bed, if unable to ambulate.  Recent Flowsheet Documentation  Taken 3/26/2024 1444 by Sabrina Short RN  Activity Management:   back to bed   activity minimized  Taken 3/26/2024 1442 by Sabrina Short RN  Activity Management: up to bedside commode  Taken 3/26/2024 1349 by Sabrina Short RN  Activity Management:   back to bed   activity minimized  Taken 3/26/2024 1344 by Sabrina Short RN  Activity Management: up to bedside commode  Taken 3/26/2024 1316 by Sabrina Short RN  Activity Management:   back to bed   activity minimized  Taken 3/26/2024 1314 by Sabrina Short RN  Activity Management:   activity minimized   up to bedside commode  Taken 3/26/2024 1054 by Sabrina Short RN  Activity Management:   back to bed   activity minimized  Taken 3/26/2024 1050 by Sabrina Short RN  Activity Management: up to bedside commode  Taken 3/26/2024 0748 by Sabrina Short RN  Activity Management:   activity minimized   up to bedside commode  VTE Prevention/Management:   bilateral   sequential compression devices on  Intervention: Prevent  Infection  Description: Maintain skin and mucous membrane integrity; promote hand, oral and pulmonary hygiene.  Optimize fluid balance, nutrition, sleep and glycemic control to maximize infection resistance.  Identify potential sources of infection early to prevent or mitigate progression of infection (e.g., wound, lines, devices).  Evaluate ongoing need for invasive devices; remove promptly when no longer indicated.  Recent Flowsheet Documentation  Taken 3/26/2024 1442 by Sabrina Short RN  Infection Prevention:   personal protective equipment utilized   single patient room provided   hand hygiene promoted  Taken 3/26/2024 1344 by Sabrina Short RN  Infection Prevention:   personal protective equipment utilized   single patient room provided   hand hygiene promoted  Taken 3/26/2024 1223 by Sabrina Short RN  Infection Prevention:   personal protective equipment utilized   single patient room provided   hand hygiene promoted  Taken 3/26/2024 1125 by Sabrina Short RN  Infection Prevention:   personal protective equipment utilized   hand hygiene promoted   single patient room provided  Taken 3/26/2024 1030 by Sabrina Short RN  Infection Prevention:   personal protective equipment utilized   single patient room provided   hand hygiene promoted  Taken 3/26/2024 0945 by Sabrina Short RN  Infection Prevention: personal protective equipment utilized  Taken 3/26/2024 0840 by Sabrina Short RN  Infection Prevention:   personal protective equipment utilized   hand hygiene promoted   single patient room provided  Taken 3/26/2024 0748 by Sabrina Short RN  Infection Prevention:   personal protective equipment utilized   single patient room provided   hand hygiene promoted  Goal: Optimal Comfort and Wellbeing  Outcome: Ongoing, Progressing  Intervention: Monitor Pain and Promote Comfort  Description: Assess pain level, treatment efficacy and patient response at regular intervals using a consistent pain  scale.  Consider the presence and impact of preexisting chronic pain.  Encourage patient and caregiver involvement in pain assessment, interventions and safety measures.  Recent Flowsheet Documentation  Taken 3/26/2024 1344 by Sabrina Short RN  Pain Management Interventions:   pillow support provided   position adjusted  Taken 3/26/2024 1223 by Sabrina Short RN  Pain Management Interventions:   pillow support provided   position adjusted  Taken 3/26/2024 1125 by Sabrina Short RN  Pain Management Interventions:   pillow support provided   position adjusted  Taken 3/26/2024 1030 by Sabrina Short RN  Pain Management Interventions:   position adjusted   pillow support provided  Taken 3/26/2024 0945 by Sabrian Short RN  Pain Management Interventions:   pillow support provided   position adjusted  Taken 3/26/2024 0840 by Sabrina Short RN  Pain Management Interventions:   pillow support provided   position adjusted  Taken 3/26/2024 0748 by Sabrina Short RN  Pain Management Interventions:   position adjusted   pillow support provided  Intervention: Provide Person-Centered Care  Description: Use a family-focused approach to care.  Develop trust and rapport by proactively providing information, encouraging questions, addressing concerns and offering reassurance.  Acknowledge emotional response to hospitalization.  Recognize and utilize personal coping strategies.  Honor spiritual and cultural preferences.  Recent Flowsheet Documentation  Taken 3/26/2024 0748 by Sabrina Short RN  Trust Relationship/Rapport:   care explained   choices provided   emotional support provided   empathic listening provided   questions answered   questions encouraged   reassurance provided   thoughts/feelings acknowledged  Goal: Readiness for Transition of Care  Outcome: Ongoing, Progressing     Problem: Maternal-Fetal Wellbeing  Goal: Optimal Maternal-Fetal Wellbeing  Outcome: Ongoing, Progressing  Intervention: Support Psychosocial  Response to Complications During Pregnancy  Description: Acknowledge, normalize and validate difficulty managing major lifestyle changes.  Recognize the role maternal stress plays in pregnancy complications.  Provide opportunity for expression of concerns regarding maternal and fetal wellbeing.  Use a family-focused approach to care; emphasize importance of support system for entire family.  Discuss and prepare for risk of maternal-fetal adverse outcome.  Honor and incorporate cultural beliefs, rituals and practices.  Assist patient with stress-reduction techniques (e.g., journaling, verbalization, relaxation techniques, problem-solving).  Recognize current coping strategies; aid in developing new strategies.  Empower patient to “take control” of situation through self-care management and integration.  Monitor patient perspective regarding quality of life and pregnancy.  Assess and monitor for signs and symptoms of psychosocial concerns (e.g., depression, anxiety).  Provide information on resources for additional information.  Recent Flowsheet Documentation  Taken 3/26/2024 0748 by Sabrina Short RN  Family/Support System Care:   involvement promoted   presence promoted   support provided     Problem: Bleeding (Cervical Cerclage)  Goal: Absence of Bleeding  Outcome: Ongoing, Progressing     Problem: Bowel Motility Impaired (Cervical Cerclage)  Goal: Effective Bowel Elimination  Outcome: Ongoing, Progressing     Problem: Change in Maternal-Fetal Status (Cervical Cerclage)  Goal: Optimal Maternal and Fetal Wellbeing  Outcome: Ongoing, Progressing     Problem: Fluid and Electrolyte Imbalance (Cervical Cerclage)  Goal: Fluid and Electrolyte Balance  Outcome: Ongoing, Progressing     Problem: Infection (Cervical Cerclage)  Goal: Absence of Infection Signs and Symptoms  Outcome: Ongoing, Progressing     Problem: Ongoing Anesthesia Effects (Cervical Cerclage)  Goal: Anesthesia/Sedation Recovery  Outcome: Ongoing,  Progressing  Intervention: Optimize Anesthesia Recovery  Description: Assess and monitor airway, breathing and circulation; maintain close surveillance for deterioration.  Implement continuous monitoring, such as cardiorespiratory, blood pressure, temperature, pulse oximetry and capnography.  Elevate head of bed, if able; facilitate regular position changes.  Assess neurocognitive function and for risks that may lead to postoperative delirium, such as decreased level of consciousness, pain and agitation; offer reassurance; answer questions.  Assess and monitor neurovascular and neuromuscular function, such as motor strength, tone, posture, peripheral pulses and extremity sensation; protect areas of decreased sensation from heat, cold, medical devices or objects.  Individualize frequency and intensity of monitoring based on sedation or anesthesia administered, identified risk factors, ongoing assessment and organizational protocol.  Prepare for administration of pharmacologic therapy, such as reversal agent, antiemetic or antipruritic medication, to manage sedation or anesthesia effects.  Adjust environment to maintain safety (e.g., fall precautions, safety equipment).  Recent Flowsheet Documentation  Taken 3/26/2024 1442 by Sabrina Short, RN  Safety Promotion/Fall Prevention:   safety round/check completed   clutter free environment maintained   assistive device/personal items within reach   fall prevention program maintained   nonskid shoes/slippers when out of bed  Taken 3/26/2024 1344 by Sabrina Short, RN  Safety Promotion/Fall Prevention:   safety round/check completed   assistive device/personal items within reach   clutter free environment maintained   fall prevention program maintained   nonskid shoes/slippers when out of bed  Taken 3/26/2024 1223 by Sabrina Short, RN  Safety Promotion/Fall Prevention:   safety round/check completed   clutter free environment maintained   assistive device/personal items  within reach   fall prevention program maintained   nonskid shoes/slippers when out of bed  Taken 3/26/2024 1125 by Sabrina Short RN  Safety Promotion/Fall Prevention:   safety round/check completed   assistive device/personal items within reach   clutter free environment maintained   fall prevention program maintained   nonskid shoes/slippers when out of bed  Taken 3/26/2024 1030 by Sabrina Short RN  Safety Promotion/Fall Prevention:   safety round/check completed   clutter free environment maintained   assistive device/personal items within reach   fall prevention program maintained   nonskid shoes/slippers when out of bed  Taken 3/26/2024 0945 by Sabrina Short RN  Safety Promotion/Fall Prevention:   safety round/check completed   assistive device/personal items within reach   clutter free environment maintained   fall prevention program maintained   nonskid shoes/slippers when out of bed  Taken 3/26/2024 0840 by Sabrina Short RN  Safety Promotion/Fall Prevention:   safety round/check completed   assistive device/personal items within reach   clutter free environment maintained   fall prevention program maintained   nonskid shoes/slippers when out of bed  Taken 3/26/2024 0748 by Sabrina Short RN  Safety Promotion/Fall Prevention:   safety round/check completed   clutter free environment maintained   assistive device/personal items within reach   fall prevention program maintained   nonskid shoes/slippers when out of bed     Problem: Pain (Cervical Cerclage)  Goal: Acceptable Pain Control  Outcome: Ongoing, Progressing  Intervention: Prevent or Manage Pain  Description: Develop mutual pain management plan with patient and caregiver/family; review regularly.  Combine multimodal analgesia and nonpharmacologic strategies to help potentiate synergistic effects, enhance comfort and improve function (e.g., complementary therapy, diversional activity, mindfulness).  Evaluate risk for opioid use; individualize  pharmacologic pain management plan and titrate medication to patient response.  Provide around-the-clock dosing of pain medication to keep pain levels in control.  Manage medication-induced effects, such as respiratory depression, constipation, nausea, vomiting.  Minimize pain stimuli; coordinate care and adjust environment (e.g., light, noise, unnecessary movement); promote sleep/rest for optimal healing.  Recent Flowsheet Documentation  Taken 3/26/2024 1344 by Sabrina Short RN  Pain Management Interventions:   pillow support provided   position adjusted  Taken 3/26/2024 1223 by Sabrina Short RN  Pain Management Interventions:   pillow support provided   position adjusted  Taken 3/26/2024 1125 by Sabrina Short RN  Pain Management Interventions:   pillow support provided   position adjusted  Taken 3/26/2024 1030 by Sabrina Short RN  Pain Management Interventions:   position adjusted   pillow support provided  Taken 3/26/2024 0945 by Sabrina Short RN  Pain Management Interventions:   pillow support provided   position adjusted  Taken 3/26/2024 0840 by Sabrina Short RN  Pain Management Interventions:   pillow support provided   position adjusted  Taken 3/26/2024 0748 by Sabrina Short RN  Pain Management Interventions:   position adjusted   pillow support provided  Diversional Activities:   smartphone   television     Problem: Postoperative Nausea and Vomiting (Cervical Cerclage)  Goal: Nausea and Vomiting Relief  Outcome: Ongoing, Progressing     Problem: Postoperative Urinary Retention (Cervical Cerclage)  Goal: Effective Urinary Elimination  Outcome: Ongoing, Progressing     Problem: Respiratory Compromise (Cervical Cerclage)  Goal: Effective Oxygenation and Ventilation  Outcome: Ongoing, Progressing  Intervention: Optimize Oxygenation and Ventilation  Description: Recognize risk for obstructive sleep apnea; anticipate the need for continuous pulse oximetry and noninvasive positive pressure  ventilation.  Maintain patent airway with positioning, airway adjuncts, secretion clearance.  Encourage pulmonary hygiene, such as cough-enhancement and airway-clearance techniques, that may include use of incentive spirometry, deep breathing and cough.  Anticipate the need for splinting with cough to minimize discomfort; assist if needed.  Provide oxygen therapy judiciously, if hypoxemia present.  Consider pharmacologic therapy that may improve mucus clearance and reduce bronchospasm, laryngospasm, swelling or stridor.  Consider the need for intubation and invasive positive pressure ventilation for longer recovery needs; use lung protective measures.  Recent Flowsheet Documentation  Taken 3/26/2024 2946 by Sabrina Short, RN  Head of Bed (HOB) Positioning: HOB at 20-30 degrees   Goal Outcome Evaluation:  Plan of Care Reviewed With: patient, spouse        Progress: improving

## 2024-03-26 NOTE — CONSULTS
Maternal/Fetal Medicine Consult Note     Name: Ninfa Jackson    : 1990     MRN: [id number]     Requesting Provider: Nelida Olmstead MD    HD#: 2    Chief Complaint:  Short cervix     Subjective     History of Present Illness:  Ninfa Jackson is a 33 y.o.  20w1d admitted yesterday with short cervix and a history of 18 week PPROM.    Patient seen at primary OB office (Dr Olmstead) yesterday after two day history of low back pain. No VB. On ultrasound there noted to have funneled cervix and be dilated to 1cm. No fever reported. She was transferred to [facility]. Here started on Indocin/Cefazolin/Gent/Clindamycin.     OB history notable for prior 34 week PCS for PPROM, FTP (she reports pushing for some time then transition to section). This was followed by 18 week PPROM in which the fetus delivered partially but then underwent D&E due to incomplete Ab. She has also had several first trimester losses and a 11 week missed Ab for T15 (D&C)     She saw MFM earlier this pregnancy and was counseled to options. She has been taking vaginal progesterone since that time.     Currently reports no contractions, LOF, VB.           SHIRIN: Estimated Date of Delivery: 24     ROS:   As noted in HPI.     Past Medical History:   Diagnosis Date    Anxiety     Depression     Hyperemesis gravidarum       Past Surgical History:   Procedure Laterality Date     SECTION PRIMARY      FTP    D & C WITH SUCTION      D & E SECOND TRIMESTER      DEEP NECK LYMPH NODE BIOPSY / EXCISION      WISDOM TOOTH EXTRACTION        History reviewed. No pertinent family history.   OB History          6    Para   2    Term   0       2    AB   3    Living   1         SAB   3    IAB   0    Ectopic   0    Molar   0    Multiple   0    Live Births   1          Obstetric Comments   FOB #1 Pregnancy #1 SAB at 8 weeks  FOB #1 Pregnancy #2 P C/S at 34 6/7 weeks, FTP, male  FOB #1 Pregnancy #3 SAB at 7  "weeks,  FOB #1 Pregnancy #4 PPROM at 18 weeks, unable to deliver all of fetus, decapitated D&E done  FOB #1 Pregnancy #5 D&E at 11 week s, Trisomy 15 after demise  FOB #1 Pregnancy #6  current               Prior to Admission medications    Medication Sig Start Date End Date Taking? Authorizing Provider   citalopram (CeleXA) 40 MG tablet Take 1 tablet by mouth Daily.   Yes Santiago Hein MD   ondansetron (ZOFRAN) 4 MG/5ML solution Take 5 mL by mouth 1 (One) Time As Needed for Nausea or Vomiting.   Yes Santiago Hein MD   Prenatal Vit-Fe Fumarate-FA (prenatal vitamin 27-0.8) 27-0.8 MG tablet tablet Take  by mouth Daily.   Yes Santiago Hein MD   Progesterone (PROMETRIUM) 200 MG capsule Take 1 capsule by mouth Daily. Mail order waiting to come in to start   Yes Santiago Hein MD   vitamin D3 125 MCG (5000 UT) capsule capsule Take 1 capsule by mouth 2 (Two) Times a Day.   Yes Santiago Hein MD   Enoxaparin Sodium (LOVENOX) 30 MG/0.3ML solution prefilled syringe syringe Inject 0.3 mL under the skin into the appropriate area as directed Daily.    Santiago Hein MD          Objective     Vital Signs  /75 (BP Location: Right arm, Patient Position: Lying)   Pulse 93   Temp 97.7 °F (36.5 °C) (Oral)   Resp 16   Ht 170.2 cm (67\")   Wt 127 kg (280 lb)   LMP 10/28/2023 (Approximate)   Estimated body mass index is 43.85 kg/m² as calculated from the following:    Height as of this encounter: 170.2 cm (67\").    Weight as of this encounter: 127 kg (280 lb).    Physical Exam    NAD   Resting comfortably   Normal pulmonary effort       [place]: None     WBC   Date Value Ref Range Status   03/25/2024 9.48 3.40 - 10.80 10*3/mm3 Final     RBC   Date Value Ref Range Status   03/25/2024 4.36 3.77 - 5.28 10*6/mm3 Final     Hemoglobin   Date Value Ref Range Status   03/25/2024 12.9 12.0 - 15.9 g/dL Final     Hematocrit   Date Value Ref Range Status   03/25/2024 38.5 34.0 - 46.6 % Final "     MCV   Date Value Ref Range Status   03/25/2024 88.3 79.0 - 97.0 fL Final     MCH   Date Value Ref Range Status   03/25/2024 29.6 26.6 - 33.0 pg Final     MCHC   Date Value Ref Range Status   03/25/2024 33.5 31.5 - 35.7 g/dL Final     RDW   Date Value Ref Range Status   03/25/2024 13.0 12.3 - 15.4 % Final     RDW-SD   Date Value Ref Range Status   03/25/2024 42.4 37.0 - 54.0 fl Final     MPV   Date Value Ref Range Status   03/25/2024 10.0 6.0 - 12.0 fL Final     Platelets   Date Value Ref Range Status   03/25/2024 363 140 - 450 10*3/mm3 Final     Neutrophil %   Date Value Ref Range Status   03/25/2024 70.9 42.7 - 76.0 % Final     Lymphocyte %   Date Value Ref Range Status   03/25/2024 19.2 (L) 19.6 - 45.3 % Final     Monocyte %   Date Value Ref Range Status   03/25/2024 7.9 5.0 - 12.0 % Final     Eosinophil %   Date Value Ref Range Status   03/25/2024 0.4 0.3 - 6.2 % Final     Basophil %   Date Value Ref Range Status   03/25/2024 0.3 0.0 - 1.5 % Final     Immature Grans %   Date Value Ref Range Status   03/25/2024 1.3 (H) 0.0 - 0.5 % Final     Neutrophils, Absolute   Date Value Ref Range Status   03/25/2024 6.72 1.70 - 7.00 10*3/mm3 Final     Lymphocytes, Absolute   Date Value Ref Range Status   03/25/2024 1.82 0.70 - 3.10 10*3/mm3 Final     Monocytes, Absolute   Date Value Ref Range Status   03/25/2024 0.75 0.10 - 0.90 10*3/mm3 Final     Eosinophils, Absolute   Date Value Ref Range Status   03/25/2024 0.04 0.00 - 0.40 10*3/mm3 Final     Basophils, Absolute   Date Value Ref Range Status   03/25/2024 0.03 0.00 - 0.20 10*3/mm3 Final     Immature Grans, Absolute   Date Value Ref Range Status   03/25/2024 0.12 (H) 0.00 - 0.05 10*3/mm3 Final     nRBC   Date Value Ref Range Status   03/25/2024 0.0 0.0 - 0.2 /100 WBC Final      Lab Results   Component Value Date    BUN 8 07/28/2022    CREATININE 0.50 (L) 03/25/2024    BCR 11 07/28/2022    K 4.2 07/28/2022    CO2 28 07/28/2022    CALCIUM 8.7 07/28/2022    ALBUMIN 4.0  "2022    BILITOT <0.2 2024    AST 49 (H) 2024    ALT 20 2024      UA          3/25/2024    16:38   Urinalysis   Specific Gravity, UA 1.007    Ketones, UA Negative    Blood, UA Negative    Leukocytes, UA Negative    Nitrite, UA Negative       No results found for: \"CREATININEUR\", \"PROTEINUR\", \"UTPCR\"        Results from last 7 days   Lab Units 24  1638   WBC 10*3/mm3 9.48   HEMOGLOBIN g/dL 12.9   HEMATOCRIT % 38.5   PLATELETS 10*3/mm3 363   ALT (SGPT) U/L 20   AST (SGOT) U/L 49*   CREATININE mg/dL 0.50*   BILIRUBIN mg/dL <0.2          Lab Results   Component Value Date    PROTEINUA Negative 2024       Imaging:  See Viewpoint   Size consistent with dates.     No fetal anomalies were identified.     No fetal markers for trisomy.     On transvaginal scan, ccervix funnels to the external os.        Assessment and Plan       33 y.o.  20w1d with cervical insufficiency, failed vaginal progesterone.   No current overt evidence of labor though she did have some back pain prior to admission   No evidence of abruption   No overt evidence of chorioamnionitis though there is some sludge noted at the internal os within the amniotic sac   Discussed the possibility of amniocentesis for evaluation for subclinical chorioamnionitis. At this point, will continue to observe, continue abx, continue indocin overnight with plan for cerclage in the AM.   Discussed that should she show evidence of chorio, labor or abruption, cerclage would not be appropriate due to increased risk of maternal morbidity. Discussed that should she develop chorioamnionitis, delivery would be indicated   Patient questions, concerns addressed   Plan for exam indicated cerclage tomorrow AM   NPO after midnight     I spent 60 minutes caring for the patient on the day of service. This included: obtaining or reviewing a separately obtained medical history, reviewing patient records, performing a medically appropriate exam " and/or evaluation, counseling or educating the patient/family/caregiver, ordering medications, labs, and/or procedures and documenting such in the medical record. This does not include time spent on review and interpretation of other tests such as fetal ultrasound or the performance of other procedures such as amniocentesis or CVS.    Yaritza Echevarria MD FACOG  Maternal Fetal Medicine, [facility]     2024

## 2024-03-26 NOTE — ANESTHESIA PREPROCEDURE EVALUATION
Anesthesia Evaluation     Patient summary reviewed and Nursing notes reviewed   NPO Solid Status: > 8 hours  NPO Liquid Status: > 2 hours           Airway   Mallampati: II  TM distance: >3 FB  Neck ROM: full  Dental      Pulmonary - negative pulmonary ROS   Cardiovascular - negative cardio ROS        Neuro/Psych- negative ROS  (+) psychiatric history Anxiety and Depression  GI/Hepatic/Renal/Endo - negative ROS   (+) morbid obesity    Musculoskeletal (-) negative ROS    Abdominal    Substance History - negative use     OB/GYN negative ob/gyn ROS   (+) Pregnant        Other - negative ROS                   Anesthesia Plan    ASA 3     spinal and ITN       Anesthetic plan, risks, benefits, and alternatives have been provided, discussed and informed consent has been obtained with: patient.    Plan discussed with attending.    CODE STATUS:    Level Of Support Discussed With: Patient  Code Status (Patient has no pulse and is not breathing): CPR (Attempt to Resuscitate)  Medical Interventions (Patient has pulse or is breathing): Full Support

## 2024-03-26 NOTE — PAYOR COMM NOTE
"Ninfa Jackson (33 y.o. Female) Initial notification  Ref #[id number]    1990      Date of Birth   1990    Social Security Number       Address   [address] [place] [address] [address] [place] [address]    Home Phone   [phone]    MRN   [id number]       Synagogue   None    Marital Status                               Admission Date   3/25/24    Admission Type   Elective    Admitting Provider   Aung Rajan DO    Attending Provider   Aung Rajan DO    Department, Room/Bed   [facility] ANTEPARTUM, N332/1       Discharge Date       Discharge Disposition       Discharge Destination                                 Attending Provider: Aung Rajan DO    Allergies: Penicillins    Isolation: None   Infection: None   Code Status: CPR    Ht: 170.2 cm (67\")   Wt: 127 kg (280 lb)    Admission Cmt: None   Principal Problem: Incompetent cervix during second trimester, antepartum [O34.32]                   Active Insurance as of 3/25/2024       Primary Coverage       Payor Plan Insurance Group Employer/Plan Group    [id number] [facility]       Payor Plan Address Payor Plan Phone Number Payor Plan Fax Number Effective Dates    [address]   3/18/2018 - None Entered    [facility] [id number]         Subscriber Name Subscriber Birth Date Member ID       NINFA JACKSON 1990 [id number]                     Emergency Contacts        (Rel.) Home Phone Work Phone Mobile Phone    CARMEN JACKSON (Spouse) [phone] -- [phone]              Insurance Information                  [employer] Phone: --    Subscriber: Ninfa Jackson Subscriber#: [id number]    Group#: [id number] Precert#: [id number]             History & Physical        Aung Rajan DO at [address] [place] [address]  Obstetric History and Physical    Referring Provider: Nelida Olmstead MD      Cc: Short cervical length    Subjective    Patient is a 33 y.o. " "female  currently at 20w0d, who presents as a transfer from [facility] for evaluation of possible incompetent cervix.  Presented to primary OB today with complaint of  back pain and menstrual cramping.  Ultrasound revealed short cervix with funneling.  Cervical exam noted be 1 cm per Dr. Olmstead.  Patient denies leaking of fluid, vaginal bleeding, recent trauma, urinary symptoms, fever, any other concerns.  Most recent PDC ultrasound at 18 weeks  revealed normal cervical length.  Obstetrical history significant for pre-PROM at 18 weeks requiring a D&E after failed spontaneous , primary  34 weeks 6 days due to failure to progress, and a first trimester D&E 11 weeks due to aneuploidy.  Patient currently denies abdominal pain any other concerns.        The following portions of the patients history were reviewed and updated as appropriate: current medications, allergies, past medical history, past surgical history, past family history, past social history, and problem list .       Prenatal Information:   Maternal Prenatal Labs  Blood Type No results found for: \"ABO\"   Rh Status No results found for: \"RH\"   Antibody Screen No results found for: \"ABSCRN\"   Gonnorhea No results found for: \"GCCX\"   Chlamydia No results found for: \"CLAMYDCU\"   RPR No results found for: \"RPR\"   Syphilis Antibody No results found for: \"SYPHILIS\"   Rubella No results found for: \"RUBELLAIGGIN\"   Hepatitis B Surface Antigen No results found for: \"HEPBSAG\"   HIV-1 Antibody No results found for: \"LABHIV1\"   Hepatitis C Antibody No results found for: \"HEPCAB\"   Rapid Urin Drug Screen No results found for: \"AMPMETHU\", \"BARBITSCNUR\", \"LABBENZSCN\", \"LABMETHSCN\", \"LABOPIASCN\", \"THCURSCR\", \"COCAINEUR\", \"AMPHETSCREEN\", \"PROPOXSCN\", \"BUPRENORSCNU\", \"METAMPSCNUR\", \"OXYCODONESCN\", \"TRICYCLICSCN\"   Group B Strep Culture No results found for: \"GBSANTIGEN\"           External Prenatal Results       Pregnancy Outside Results - " Transcribed From Office Records - See Scanned Records For Details       Test Value Date Time    ABO       Rh       Antibody Screen ^ NEG  20 1346    Varicella IgG       Rubella ^ POSITIVE  20 1346    Hgb ^ 13.6 g/dL 22 1656    Hct ^ 41.8 % 22 1656    Glucose Fasting GTT       Glucose Tolerance Test 1 hour       Glucose Tolerance Test 3 hour       Gonorrhea (discrete)       Chlamydia (discrete)       RPR ^ Non-Reactive  20 1346    VDRL       Syphilis Antibody       HBsAg ^ NEGATIVE  20 1346    Herpes Simplex Virus PCR       Herpes Simplex VIrus Culture       HIV       Hep C RNA Quant PCR       Hep C Antibody       AFP       Group B Strep       GBS Susceptibility to Clindamycin       GBS Susceptibility to Erythromycin       Fetal Fibronectin       Genetic Testing, Maternal Blood                 Drug Screening       Test Value Date Time    Urine Drug Screen       Amphetamine Screen       Barbiturate Screen       Benzodiazepine Screen       Methadone Screen       Phencyclidine Screen       Opiates Screen       THC Screen       Cocaine Screen       Propoxyphene Screen       Buprenorphine Screen       Methamphetamine Screen       Oxycodone Screen       Tricyclic Antidepressants Screen                 Legend    ^: Historical                              Past OB History:       OB History    Para Term  AB Living   6 2 0 2 3 1   SAB IAB Ectopic Molar Multiple Live Births   3 0 0 0 0 1      # Outcome Date GA Lbr Kris/2nd Weight Sex Type Anes PTL Lv   6 Current            5 SAB 2023 11w0d   M SAB      4  20 18w2d   F    FD   3 SAB 2019 7w0d    SAB      2  18 34w6d   M CS-Unspec EPI  ADY      Complications: Failure to Progress in Second Stage   1 SAB 2017 8w0d    SAB         Obstetric Comments   FOB #1 Pregnancy #1 SAB at 8 weeks   FOB #1 Pregnancy #2 P C/S at 34 6/7 weeks, FTP, male   FOB #1 Pregnancy #3 SAB at 7 weeks,   FOB #1 Pregnancy #4  PPROM at 18 weeks, unable to deliver all of fetus, decapitated D&E done   FOB #1 Pregnancy #5 D&E at 11 weeks, Trisomy 15 after demise   FOB #1 Pregnancy #6  current       Past Medical History: Past Medical History:   Diagnosis Date    Anxiety     Depression     Hyperemesis gravidarum       Past Surgical History Past Surgical History:   Procedure Laterality Date     SECTION PRIMARY  2018    FTP    D & C WITH SUCTION      D & E SECOND TRIMESTER      DEEP NECK LYMPH NODE BIOPSY / EXCISION      WISDOM TOOTH EXTRACTION        Family History: No family history on file.   Social History:  reports that she has never smoked. She has never used smokeless tobacco.   reports that she does not currently use alcohol.   reports no history of drug use.                   General ROS Negative Findings:Headaches, Visual Changes, Epigastric pain, Anorexia, Nausia/Vomiting, ROM, and Vaginal Bleeding    ROS     All other systems have been reviewed and are neg  Objective      Vital Signs Range for the last 24 hours  Temperature: Temp:  [98.6 °F (37 °C)] 98.6 °F (37 °C)   Temp Source: Temp src: Oral   BP: BP: (131)/(79) 131/79   Pulse: Heart Rate:  [90] 90   Respirations: Resp:  [18] 18   SPO2: SpO2:  [97 %] 97 %   O2 Amount (l/min):     O2 Devices     Weight: Weight:  [127 kg (280 lb)] 127 kg (280 lb)     Physical Examination:   General:   alert, appears stated age, and cooperative   Skin:   normal   HEENT:     Lungs:   clear to auscultation bilaterally   Heart:   regular rate and rhythm, S1, S2 normal, no murmur, click, rub or gallop   Gastrointestinal: Abdomen soft, gravid uterus nontender, no guarding, no rebound negative CVA tenderness.   Lower Extremities: No edema, no calf tenderness   : Exam deferred.   Musculoskeletal:     Neuro: No focal deficits noted.             Fetal Heart Rate Assessment   Method:     Beats/min:     Baseline:     Varibility:     Accels:     Decels:     Tracing Category:        Uterine Assessment   Method:     Frequency (min):     Ctx Count in 10 min:     Duration:     Intensity:     Intensity by IUPC:     Resting Tone:     Resting Tone by IUPC:     [place] Units:       Laboratory Results:   Lab Results (last 24 hours)       Procedure Component Value Units Date/Time    T Pallidum Antibody w/ reflex RPR (Syphilis) [[id number]] Collected: 24    Specimen: Blood Updated: 24    Preeclampsia Panel [[id number]] Collected: 24    Specimen: Blood Updated: 24    CBC & Differential [[id number]] Collected: 24    Specimen: Blood Updated: 24    Narrative:      The following orders were created for panel order CBC & Differential.  Procedure                               Abnormality         Status                     ---------                               -----------         ------                     CBC Auto Differential[[id number]]                            In process                   Please view results for these tests on the individual orders.    CBC Auto Differential [[id number]] Collected: 24    Specimen: Blood Updated: 24          Radiology Review:   Imaging Results (Last 24 Hours)       ** No results found for the last 24 hours. 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  Intrauterine pregnancy at 20w0d weeks gestation with reassuring fetal status.    2.  Incompetent cervix versus  labor  3.  No evidence of acute infection.  4.  History of  x 1 at 34 weeks 6 days  5.  History of D&E 11 and 18 weeks due to aneuploidy and failed spontaneous AB at 18 weeks gestation  6.  MTHFR  heterozygous  7.  Obesity BMI 43.85  8.  AST slightly elevated at 49 most likely DUNNE  Plan:  1.  Admit to antepartum unit, continuous toco, Indocin tocolysis, antibiotics, n.p.o. after midnight, PDC ultrasound in a.m. possible cerclage  2. Plan of care has been reviewed with patient.  3.  Risks, benefits of treatment plan have been discussed.  4.  All questions have been answered.  5      Aung Rajan DO  3/25/2024  16:57 EDT    Electronically signed by Aung Rajan DO at 03/26/24 0804       Vital Signs (last 2 days)       Date/Time Temp Temp src Pulse Resp BP Patient Position SpO2    03/26/24 0735 97.7 (36.5) Oral 93 16 122/75 Lying --    03/26/24 0445 98.2 (36.8) Oral 96 16 117/59 Lying --    03/26/24 0024 97.6 (36.4) -- 98 16 150/82 -- --    03/25/24 1935 98.6 (37) Oral 86 16 126/69 Lying --    03/25/24 1535 98.6 (37) Oral 90 18 131/79 Sitting 97          Current Facility-Administered Medications   Medication Dose Route Frequency Provider Last Rate Last Admin    acetaminophen (TYLENOL) tablet 650 mg  650 mg Oral Q4H PRN Aung Rajan DO        bisacodyl (DULCOLAX) suppository 10 mg  10 mg Rectal Daily PRN Aung Rajan DO        ceFAZolin 1000 mg IVPB in 100 mL NS (MBP)  1,000 mg Intravenous Q8H Aung Rajan DO   1,000 mg at 03/26/24 0527    clindamycin (CLEOCIN) 900 mg in dextrose 5% 50 mL IVPB (premix)  900 mg Intravenous Q8H Aung Rajan  mL/hr at 03/26/24 0949 900 mg at 03/26/24 0949    docusate sodium (COLACE) capsule 100 mg  100 mg Oral BID PRN Aung Rajan DO        gentamicin (GARAMYCIN) IVPB 120 mg  120 mg Intravenous Q8H Aung Rajan DO   120 mg at 03/26/24 1034    indomethacin (INDOCIN) capsule 25 mg  25 mg Oral Q4H Aung Rajan DO   25 mg at 03/26/24 0845    lactated ringers bolus 1,000 mL  1,000 mL Intravenous PRN Aung Rajan DO 4,000 mL/hr at 03/25/24 1645 500 mL at 03/25/24 1645    lactated ringers infusion  125 mL/hr  Intravenous Continuous Aung Rajan,  mL/hr at 03/26/24 0845 125 mL/hr at 03/26/24 0845    lidocaine PF 1% (XYLOCAINE) injection 0.5 mL  0.5 mL Intradermal Once PRN Aung Rajan,         ondansetron ODT (ZOFRAN-ODT) disintegrating tablet 8 mg  8 mg Oral Q8H PRN Aung Rajan DO        Or    ondansetron (ZOFRAN) injection 4 mg  4 mg Intravenous Q8H PRN Aung Rajan,         sodium chloride 0.9 % flush 10 mL  10 mL Intravenous Q12H Aung Rajan, DO        sodium chloride 0.9 % flush 10 mL  10 mL Intravenous PRN Aung Rajan,         sodium chloride 0.9 % infusion 40 mL  40 mL Intravenous PRN Aung Rajan DO         Lab Results (last 48 hours)       Procedure Component Value Units Date/Time    T Pallidum Antibody w/ reflex RPR (Syphilis) [[id number]]  (Normal) Collected: 03/25/24 1638    Specimen: Blood Updated: 03/25/24 2327     Treponemal AB Total Non-Reactive    Preeclampsia Panel [[id number]]  (Abnormal) Collected: 03/25/24 1638    Specimen: Blood Updated: 03/25/24 1714     Alkaline Phosphatase 109 U/L      ALT (SGPT) 20 U/L      Comment: Specimen hemolyzed.  Result may  be falsely elevated.        AST (SGOT) 49 U/L      Comment: Specimen hemolyzed.  Result may be falsely elevated.        Creatinine 0.50 mg/dL      Total Bilirubin <0.2 mg/dL       U/L      Comment: Specimen hemolyzed.  Results may be affected.        Uric Acid 4.2 mg/dL     Urinalysis With Culture If Indicated - Urine, Clean Catch [[id number]]  (Normal) Collected: 03/25/24 1638    Specimen: Urine, Clean Catch Updated: 03/25/24 1709     Color, UA Yellow     Appearance, UA Clear     pH, UA 6.0     Specific Gravity, UA 1.007     Glucose, UA Negative     Ketones, UA Negative     Bilirubin, UA Negative     Blood, UA Negative     Protein, UA Negative     Leuk Esterase, UA Negative     Nitrite, UA Negative     Urobilinogen, UA 0.2 E.U./dL    Narrative:      In absence of clinical symptoms, the  presence of pyuria, bacteria, and/or nitrites on the urinalysis result does not correlate with infection.  Urine microscopic not indicated.    CBC & Differential [[id number]]  (Abnormal) Collected: 24    Specimen: Blood Updated: 24    Narrative:      The following orders were created for panel order CBC & Differential.  Procedure                               Abnormality         Status                     ---------                               -----------         ------                     CBC Auto Differential[[id number]]        Abnormal            Final result                 Please view results for these tests on the individual orders.    CBC Auto Differential [[id number]]  (Abnormal) Collected: 24    Specimen: Blood Updated: 24     WBC 9.48 10*3/mm3      RBC 4.36 10*6/mm3      Hemoglobin 12.9 g/dL      Hematocrit 38.5 %      MCV 88.3 fL      MCH 29.6 pg      MCHC 33.5 g/dL      RDW 13.0 %      RDW-SD 42.4 fl      MPV 10.0 fL      Platelets 363 10*3/mm3      Neutrophil % 70.9 %      Lymphocyte % 19.2 %      Monocyte % 7.9 %      Eosinophil % 0.4 %      Basophil % 0.3 %      Immature Grans % 1.3 %      Neutrophils, Absolute 6.72 10*3/mm3      Lymphocytes, Absolute 1.82 10*3/mm3      Monocytes, Absolute 0.75 10*3/mm3      Eosinophils, Absolute 0.04 10*3/mm3      Basophils, Absolute 0.03 10*3/mm3      Immature Grans, Absolute 0.12 10*3/mm3      nRBC 0.0 /100 WBC           Imaging Results (Last 48 Hours)       Procedure Component Value Units Date/Time    [facility]  Diagn[facility] [[id number]] Collected: 24     Updated: 24    Narrative:      PAT NAME: JENNIFER ROBLES  MED REC#: [id number]  BIRTH DA: [id number]  PAT GEND: F  ACCOUNT#: [id number]  PAT TYPE: I  EXAM CHEN: [id number]  REF PHYS SOCORRO VICENTE  ACCESSION [id number]      Comparison Studies  =================    The findings of this study are compared to the prior ultrasound study  dated 3/7/24      Patient Status  ============    Inpatient      Indication  ========    History of PROM at 18 weeks. History of 11 week loss with T15. MTHFR. Lovenox. History of PTD at 35 weeks. History of . MO BMI 44. Short cervix.      Maternal Assessment  ==================    Height 170 cm  Height (ft) 5 ft  Height (in) 7 in  Weight 127 kg  Weight (lb) 280 lb  BMI 43.95 kg/m²      Method  =======    Transabdominal and transvaginal ultrasound examination. View: Limited by patient body habitus. Suboptimal view: limited by fetal position      Pregnancy  =========    Josue pregnancy. Number of fetuses: 1      Dating  ======    Method of dating: based on stated SHIRIN  GA by prior assessment 20 w + 1 d  SHIRIN by prior assessment: 2024  Ultrasound examination on: 3/26/2024  GA by U/S based upon: AC, BPD, Femur, HC  GA by U/S 21 w + 1 d  SHIRIN by U/S: 2024  Previous dating: based on stated SHIRIN, selected on 2024  Agreed SHIRIN of previous datin2024  Assigned: based on stated SHIRIN, selected on 2024  Assigned GA 20 w + 1 d  Assigned SHIRIN: 2024  Pregnancy length 280 d      Fetal Biometry  ============    Standard  BPD 51.6 mm  21w 5d        95%        Hadlock    OFD 66.2 mm  22w 2d        98%        Syl    .1 mm  21w 1d        85%        Hadlock    Cerebellum tr 22.7 mm  21w 1d        95%        Hill    Nuchal fold 6.1 mm  .7 mm  20w 3d        56%        Hadlock    Femur 36.0 mm  21w 3d        83%        Hadlock    Humerus 32.7 mm  21w 0d        82%        Syl    HC / AC 1.24          92%        Hadlock     g    EFW (lb) 0 lb  EFW (oz) 14 oz  EFW by: Hadlock (BPD-HC-AC-FL)  Extended  Tibia 31.5 mm  21w 5d        92%        Syl    Fibula 34.0 mm  22w 4d        88%        Syl    Foot 38.2 mm          98%        Chitty    Radius 27.8 mm  20w 3d        57%        Syl    Ulna 30.8 mm  21w 4d        79%        Syl    Cav. septi pel. tr 4.3 mm   7.0  mm  CM 6.3 mm          85%        Nicolaides    Head / Face / Neck  Cephalic index 0.78          41%        Nicolaides    Extremities / Bony Struc  FL / BPD 0.70          44%        Hadlock    FL / HC 0.19          61%        Hadlock    FL / AC 0.24          91%        Hadlock    Other Structures   bpm      General Evaluation  ================    Cardiac activity present.  bpm.  Fetal movements present.  Presentation cephalic.  Placenta Placental site: posterior.  Umbilical cord Cord vessels: 3 vessel cord. Insertion site: placental insertion: normal.  Amniotic fluid Amount of AF: normal. MVP 4.2 cm.      Fetal Anatomy  ============    Cranium: Appears normal  Midline falx: Appears normal  Cavum septi pellucidi: Appears normal  Cerebellum: Appears normal  Cisterna magna: Appears normal  Head / Neck  Rt lateral ventricle: Appears normal  Lt lateral ventricle: Appears normal  Rt choroid plexus: Appears normal  Lt choroid plexus: Appears normal  Vermis: Appears normal  Neck: Appears normal  Nuchal fold: Appears normal  Lips: Appear normal  Profile: not examined  Nose: not examined  Face  Palate: suboptimal  Orbits: Appears normal  Lens: Normal  4-chamber view: suboptimal  RVOT view: suboptimal  LVOT view: suboptimal  Heart / Thorax  Aortic arch view: not examined  Ductal arch view: not examined  SVC: not examined  IVC: not examined  3-vessel view: Appears normal  3-vessel-trachea view: Appears normal  Rt lung: Appears normal  Lt lung: normal  Diaphragm: Appears normal  Diaphragm: Intact  Cord insertion: Appears normal  Stomach: Appears normal  Bladder: Appears normal  Abdomen  Rt kidney: normal  Lt kidney: normal  Liver: normal  Small bowel: normal  Large bowel: normal  Cervical spine: Appears normal  Thoracic spine: Appears normal  Lumbar spine: Appears normal  Sacral spine: Appears normal  Arms: Appears normal  Legs: Appears normal  Rt upper arm: Appears normal  Rt forearm: Appears normal  Rt  "hand: Appears normal  Rt fingers: suboptimal  Lt upper arm: Appears normal  Lt forearm: Appears normal  Lt hand: Appears normal  Lt fingers: suboptimal  Rt upper leg: Appears normal  Rt lower leg: Appears normal  Rt foot: Appears normal  Lt upper leg: Appears normal  Lt lower leg: Appears normal  Lt foot: Appears normal  Gender: male  Wants to know gender: yes      Maternal Structures  ================    Uterus / Cervix  Cervix: Visualized  Approach: Transvaginal  Cervical length 4.2 mm  Funneling: Funneling present  Ovaries / Tubes / Adnexa  Rt ovary: Visualized  Rt ovary D1 24.8 mm  Rt ovary D2 19.5 mm  Rt ovary D3 18.7 mm  Rt ovary Vol 4.7 cm³  Lt ovary: Not visualized      Impression  =========    Size consistent with dates.    No fetal anomalies were identified.    No fetal markers for trisomy.    On transvaginal scan, ccervix funnels to the external os.      Recommendation  ===============    Continue in hospital management.      Coding  ======    Description: [id number] Detailed Ultrasound  Description: [id number] Transvaginal Ultrasound OB        Sonographer: Thelma Baron RT R , MS  Physician: Doug Milligan, MD, FACOG    Electronically signed by: Doug Milligan, MD, FACOG at: 2024/03/26 08:38          Orders (last 48 hrs)        Start     Ordered    03/27/24 0001  NPO Diet NPO Type: Strict NPO  Diet Effective Midnight         03/26/24 1047    03/26/24 0954  Diet: Regular/House; Fluid Consistency: Thin (IDDSI 0)  Diet Effective Now         03/26/24 0954    03/26/24 0001  NPO Diet NPO Type: Strict NPO  Diet Effective Midnight,   Status:  Canceled         03/25/24 1647    03/25/24 2100  sodium chloride 0.9 % flush 10 mL  Every 12 Hours Scheduled         03/25/24 1535    03/25/24 2030  indomethacin (INDOCIN) capsule 25 mg  Every 4 Hours        Placed in \"Followed by\" Linked Group    03/25/24 1535    03/25/24 2000  ceFAZolin 1000 mg IVPB in 100 mL NS (MBP)  Every 8 Hours         03/25/24 1852    03/25/24 1800  " "gentamicin (GARAMYCIN) IVPB 120 mg  Every 8 Hours        Note to Pharmacy: Separate Administration Time With Ampicillin and Other Penicillins (Ampicillin / Penicillins deactivate gentamicin when infused together).    24 1650    24 1800  clindamycin (CLEOCIN) 900 mg in dextrose 5% 50 mL IVPB (premix)  Every 8 Hours         24 1650    24 1730  lactated ringers infusion  Continuous         24 1647    24 1718  Diet: Regular/House; Fluid Consistency: Thin (IDDSI 0)  Diet Effective Now,   Status:  Canceled        Comments: NPO after Midnight    24 1718    24 1654  ABO RH Specimen Verification  STAT         24 1653    24 1630  indomethacin (INDOCIN) capsule 50 mg  Once        Placed in \"Followed by\" Linked Group    24 1535    24 1600  Vital Signs q 4 while awake  Every 4 Hours      Comments: While the patient is awake.    24 1535    24 1537  [facility]  1 Time Imaging         24 1537    24 1536  Monitor Fetal Heart Tones  Once         24 1535    24 1536  Continuous Tocometry  Until Discontinued         24 1535    24 1536  Bed Rest With Bathroom Privileges  Once         24 1535    24 1535  NPO Diet NPO Type: Strict NPO  Diet Effective Now,   Status:  Canceled         24 1535    24 1535  CBC & Differential  STAT         24 1535    24 1535  Preeclampsia Panel  STAT         24 1535    24 1535  Urinalysis With Culture If Indicated - Urine, Clean Catch  Once         24 1535    24 1535  CBC Auto Differential  PROCEDURE ONCE         24 1535    24 1534  Admit To Obstetrics Inpatient  Once         24 1535    24 1534  Code Status and Medical Interventions:  Continuous         24 1535    24 1534  Place Sequential Compression Device  Once         24 1535    24 1534  Maintain Sequential " Compression Device  Continuous         24 1535    24 1534  Vital Signs Per hospital policy  Per Hospital Policy         24 1535    24 1534  Intermittent Auscultation FOR LOW RISK PATIENTS - NST on Admission Along With Intermittent Auscultation of Fetal Heart Tones.  Per Order Details        Comments: Intermittent Auscultation FOR LOW RISK PATIENTS - NST on Admission Along With Intermittent Auscultation of Fetal Heart Tones and PRN    24 1535    24 1534  NST Low risk >24 weeks:  Monitor for 30 minutes BID (Antepartum)  Once        Comments: Fetal monitoring/NST:  Antepartum >24 weeks monitor fetus 30 minutes twice daily and PRN    24 1535    24 1534  Antepartum Patients  <24 Weeks - Document Fetal Heart Tones Daily and PRN.  Per Order Details        Comments: For Antepartum Patients Less Than 24 Weeks - Document Fetal Heart Tones Daily & PRN.    24 1535    24 1534  Notify Physician (specified)  Until Discontinued         24 1535    24 1534  Notify physician for hyperstimulus (per hospital algorithm)  Until Discontinued         24 1535    24 1534  Notify physician if membranes ruptured, bleeding greater than 1 pad an hour, fetal heart tone abnormality, and severe pain  Until Discontinued         24 1535    24 1534  Initiate Group Beta Strep (GBS) Prophylaxis Protocol, If Criteria Met  Continuous        Comments: NO TREATMENT RECOMMENDED IF: 1)  Maternal GBS status known negative 2)  Scheduled  birth with intact membranes, not in labor.  3 ) Maternal GBS unknown, no risk factors.   TREAT WITH ANTIBIOTICS IF:  1)  Maternal GBS status is known postive.  2)  Maternal GBS status unknown with these risk factors:  a)  Previous infant affected by GBS infection.  b)  GBS urinary tract infection (UTI) or bacteruria during pregnancy  c)  Unexplained maternal fever in labor (greater than or equal to 100.4F or 38.0C)  d)   "Prolonged rupture of the membranes greater than or equal to 18 hours.  e)  Gestational age less than 37 weeks.    24 1535    24 1534  T Pallidum Antibody w/ reflex RPR (Syphilis)  Once         24 1535    24 1534  Type & Screen  Once         24 1535    24 1534  Insert Peripheral IV  Once         24 1535    24 1534  Saline Lock & Maintain IV Access  Continuous         24 1535    24 1533  bisacodyl (DULCOLAX) suppository 10 mg  Daily PRN         24 1535    24 1533  sodium chloride 0.9 % flush 10 mL  As Needed         24 1535    24 1533  sodium chloride 0.9 % infusion 40 mL  As Needed         24 1535    24 1533  lidocaine PF 1% (XYLOCAINE) injection 0.5 mL  Once As Needed         24 1535    24 1533  lactated ringers bolus 1,000 mL  As Needed         24 1535    24 1533  acetaminophen (TYLENOL) tablet 650 mg  Every 4 Hours PRN         24 1535    24 1533  ondansetron ODT (ZOFRAN-ODT) disintegrating tablet 8 mg  Every 8 Hours PRN        Placed in \"Or\" Linked Group    24 1535    24 1533  ondansetron (ZOFRAN) injection 4 mg  Every 8 Hours PRN        Placed in \"Or\" Linked Group    24 1535    24 1533  docusate sodium (COLACE) capsule 100 mg  2 Times Daily PRN         24 1535    Unscheduled  Position Change - For Intra-Uterine Resusitation for Hypertonus, HyperStimulation or Non-Reassuring Fetal Status  As Needed       24 1535                  Physician Progress Notes (last 48 hours)  Notes from 24 1143 through 24 1143   No notes of this type exist for this encounter.          Consult Notes (last 48 hours)        Yaritza Echevarria MD at 24 1053              Maternal/Fetal Medicine Consult Note     Name: Ninfa Jackson    : 1990     MRN: [id number]     Requesting Provider: Nelida Olmstead MD    HD#: 2    Chief Complaint:  Short " cervix     Subjective     History of Present Illness:  Ninfa Jackson is a 33 y.o.  20w1d admitted yesterday with short cervix and a history of 18 week PPROM.    Patient seen at primary OB office (Dr Olmstead) yesterday after two day history of low back pain. No VB. On ultrasound there noted to have funneled cervix and be dilated to 1cm. No fever reported. She was transferred to [facility]. Here started on Indocin/Cefazolin/Gent/Clindamycin.     OB history notable for prior 34 week PCS for PPROM, FTP (she reports pushing for some time then transition to section). This was followed by 18 week PPROM in which the fetus delivered partially but then underwent D&E due to incomplete Ab. She has also had several first trimester losses and a 11 week missed Ab for T15 (D&C)     She saw MFM earlier this pregnancy and was counseled to options. She has been taking vaginal progesterone since that time.     Currently reports no contractions, LOF, VB.           SHIRIN: Estimated Date of Delivery: 24     ROS:   As noted in HPI.     Past Medical History:   Diagnosis Date    Anxiety     Depression     Hyperemesis gravidarum       Past Surgical History:   Procedure Laterality Date     SECTION PRIMARY      FTP    D & C WITH SUCTION      D & E SECOND TRIMESTER      DEEP NECK LYMPH NODE BIOPSY / EXCISION      WISDOM TOOTH EXTRACTION        History reviewed. No pertinent family history.   OB History          6    Para   2    Term   0       2    AB   3    Living   1         SAB   3    IAB   0    Ectopic   0    Molar   0    Multiple   0    Live Births   1          Obstetric Comments   FOB #1 Pregnancy #1 SAB at 8 weeks  FOB #1 Pregnancy #2 P C/S at 34 6/7 weeks, FTP, male  FOB #1 Pregnancy #3 SAB at 7 weeks,  FOB #1 Pregnancy #4 PPROM at 18 weeks, unable to deliver all of fetus, decapitated D&E done  FOB #1 Pregnancy #5 D&E at 11 week s, Trisomy 15 after demise  FOB #1 Pregnancy #6   "current               Prior to Admission medications    Medication Sig Start Date End Date Taking? Authorizing Provider   citalopram (CeleXA) 40 MG tablet Take 1 tablet by mouth Daily.   Yes Santiago Hein MD   ondansetron (ZOFRAN) 4 MG/5ML solution Take 5 mL by mouth 1 (One) Time As Needed for Nausea or Vomiting.   Yes Santiago Hein MD   Prenatal Vit-Fe Fumarate-FA (prenatal vitamin 27-0.8) 27-0.8 MG tablet tablet Take  by mouth Daily.   Yes Santiago Hein MD   Progesterone (PROMETRIUM) 200 MG capsule Take 1 capsule by mouth Daily. Mail order waiting to come in to start   Yes Santiago Hein MD   vitamin D3 125 MCG (5000 UT) capsule capsule Take 1 capsule by mouth 2 (Two) Times a Day.   Yes Santiago Hein MD   Enoxaparin Sodium (LOVENOX) 30 MG/0.3ML solution prefilled syringe syringe Inject 0.3 mL under the skin into the appropriate area as directed Daily.    Santiago Heni MD          Objective     Vital Signs  /75 (BP Location: Right arm, Patient Position: Lying)   Pulse 93   Temp 97.7 °F (36.5 °C) (Oral)   Resp 16   Ht 170.2 cm (67\")   Wt 127 kg (280 lb)   LMP 10/28/2023 (Approximate)   Estimated body mass index is 43.85 kg/m² as calculated from the following:    Height as of this encounter: 170.2 cm (67\").    Weight as of this encounter: 127 kg (280 lb).    Physical Exam    NAD   Resting comfortably   Normal pulmonary effort       [place]: None     WBC   Date Value Ref Range Status   03/25/2024 9.48 3.40 - 10.80 10*3/mm3 Final     RBC   Date Value Ref Range Status   03/25/2024 4.36 3.77 - 5.28 10*6/mm3 Final     Hemoglobin   Date Value Ref Range Status   03/25/2024 12.9 12.0 - 15.9 g/dL Final     Hematocrit   Date Value Ref Range Status   03/25/2024 38.5 34.0 - 46.6 % Final     MCV   Date Value Ref Range Status   03/25/2024 88.3 79.0 - 97.0 fL Final     MCH   Date Value Ref Range Status   03/25/2024 29.6 26.6 - 33.0 pg Final     MCHC   Date Value Ref Range " Status   03/25/2024 33.5 31.5 - 35.7 g/dL Final     RDW   Date Value Ref Range Status   03/25/2024 13.0 12.3 - 15.4 % Final     RDW-SD   Date Value Ref Range Status   03/25/2024 42.4 37.0 - 54.0 fl Final     MPV   Date Value Ref Range Status   03/25/2024 10.0 6.0 - 12.0 fL Final     Platelets   Date Value Ref Range Status   03/25/2024 363 140 - 450 10*3/mm3 Final     Neutrophil %   Date Value Ref Range Status   03/25/2024 70.9 42.7 - 76.0 % Final     Lymphocyte %   Date Value Ref Range Status   03/25/2024 19.2 (L) 19.6 - 45.3 % Final     Monocyte %   Date Value Ref Range Status   03/25/2024 7.9 5.0 - 12.0 % Final     Eosinophil %   Date Value Ref Range Status   03/25/2024 0.4 0.3 - 6.2 % Final     Basophil %   Date Value Ref Range Status   03/25/2024 0.3 0.0 - 1.5 % Final     Immature Grans %   Date Value Ref Range Status   03/25/2024 1.3 (H) 0.0 - 0.5 % Final     Neutrophils, Absolute   Date Value Ref Range Status   03/25/2024 6.72 1.70 - 7.00 10*3/mm3 Final     Lymphocytes, Absolute   Date Value Ref Range Status   03/25/2024 1.82 0.70 - 3.10 10*3/mm3 Final     Monocytes, Absolute   Date Value Ref Range Status   03/25/2024 0.75 0.10 - 0.90 10*3/mm3 Final     Eosinophils, Absolute   Date Value Ref Range Status   03/25/2024 0.04 0.00 - 0.40 10*3/mm3 Final     Basophils, Absolute   Date Value Ref Range Status   03/25/2024 0.03 0.00 - 0.20 10*3/mm3 Final     Immature Grans, Absolute   Date Value Ref Range Status   03/25/2024 0.12 (H) 0.00 - 0.05 10*3/mm3 Final     nRBC   Date Value Ref Range Status   03/25/2024 0.0 0.0 - 0.2 /100 WBC Final      Lab Results   Component Value Date    BUN 8 07/28/2022    CREATININE 0.50 (L) 03/25/2024    BCR 11 07/28/2022    K 4.2 07/28/2022    CO2 28 07/28/2022    CALCIUM 8.7 07/28/2022    ALBUMIN 4.0 07/28/2022    BILITOT <0.2 03/25/2024    AST 49 (H) 03/25/2024    ALT 20 03/25/2024      UA          3/25/2024    16:38   Urinalysis   Specific Gravity, UA 1.007    Ketones, UA Negative   "  Blood, UA Negative    Leukocytes, UA Negative    Nitrite, UA Negative       No results found for: \"CREATININEUR\", \"PROTEINUR\", \"UTPCR\"        Results from last 7 days   Lab Units 24  1638   WBC 10*3/mm3 9.48   HEMOGLOBIN g/dL 12.9   HEMATOCRIT % 38.5   PLATELETS 10*3/mm3 363   ALT (SGPT) U/L 20   AST (SGOT) U/L 49*   CREATININE mg/dL 0.50*   BILIRUBIN mg/dL <0.2          Lab Results   Component Value Date    PROTEINUA Negative 2024       Imaging:  See Viewpoint   Size consistent with dates.     No fetal anomalies were identified.     No fetal markers for trisomy.     On transvaginal scan, ccervix funnels to the external os.        Assessment and Plan       33 y.o.  20w1d with cervical insufficiency, failed vaginal progesterone.   No current overt evidence of labor though she did have some back pain prior to admission   No evidence of abruption   No overt evidence of chorioamnionitis though there is some sludge noted at the internal os within the amniotic sac   Discussed the possibility of amniocentesis for evaluation for subclinical chorioamnionitis. At this point, will continue to observe, continue abx, continue indocin overnight with plan for cerclage in the AM.   Discussed that should she show evidence of chorio, labor or abruption, cerclage would not be appropriate due to increased risk of maternal morbidity. Discussed that should she develop chorioamnionitis, delivery would be indicated   Patient questions, concerns addressed   Plan for exam indicated cerclage tomorrow AM   NPO after midnight     I spent 60 minutes caring for the patient on the day of service. This included: obtaining or reviewing a separately obtained medical history, reviewing patient records, performing a medically appropriate exam and/or evaluation, counseling or educating the patient/family/caregiver, ordering medications, labs, and/or procedures and documenting such in the medical record. This does not include time " spent on review and interpretation of other tests such as fetal ultrasound or the performance of other procedures such as amniocentesis or CVS.    Yaritza Echevarria MD FACOG  Maternal Fetal Medicine, [facility]     2024    Electronically signed by Yaritza Echevarria MD at 24 3511

## 2024-03-27 PROCEDURE — 25010000002 FENTANYL CITRATE (PF) 100 MCG/2ML SOLUTION: Performed by: NURSE ANESTHETIST, CERTIFIED REGISTERED

## 2024-03-27 PROCEDURE — 25010000002 MIDAZOLAM PER 1 MG: Performed by: NURSE ANESTHETIST, CERTIFIED REGISTERED

## 2024-03-27 PROCEDURE — 25810000003 LACTATED RINGERS PER 1000 ML: Performed by: OBSTETRICS & GYNECOLOGY

## 2024-03-27 PROCEDURE — 25010000002 BUPIVACAINE IN DEXTROSE 0.75-8.25 % SOLUTION: Performed by: NURSE ANESTHETIST, CERTIFIED REGISTERED

## 2024-03-27 PROCEDURE — 25010000002 CLINDAMYCIN 900 MG/50ML SOLUTION: Performed by: OBSTETRICS & GYNECOLOGY

## 2024-03-27 PROCEDURE — 25010000002 CEFAZOLIN PER 500 MG: Performed by: OBSTETRICS & GYNECOLOGY

## 2024-03-27 PROCEDURE — 25010000002 ONDANSETRON PER 1 MG: Performed by: NURSE ANESTHETIST, CERTIFIED REGISTERED

## 2024-03-27 PROCEDURE — 25010000002 GENTAMICIN PER 80 MG: Performed by: OBSTETRICS & GYNECOLOGY

## 2024-03-27 PROCEDURE — 0UVC7ZZ RESTRICTION OF CERVIX, VIA NATURAL OR ARTIFICIAL OPENING: ICD-10-PCS | Performed by: OBSTETRICS & GYNECOLOGY

## 2024-03-27 PROCEDURE — 25010000002 METOCLOPRAMIDE PER 10 MG: Performed by: NURSE ANESTHETIST, CERTIFIED REGISTERED

## 2024-03-27 PROCEDURE — 59320 REVISION OF CERVIX: CPT | Performed by: OBSTETRICS & GYNECOLOGY

## 2024-03-27 RX ORDER — OXYCODONE HYDROCHLORIDE 5 MG/1
5 TABLET ORAL EVERY 4 HOURS PRN
Status: DISCONTINUED | OUTPATIENT
Start: 2024-03-27 | End: 2024-03-29

## 2024-03-27 RX ORDER — BUPIVACAINE HYDROCHLORIDE 7.5 MG/ML
INJECTION, SOLUTION INTRASPINAL AS NEEDED
Status: DISCONTINUED | OUTPATIENT
Start: 2024-03-27 | End: 2024-03-27 | Stop reason: SURG

## 2024-03-27 RX ORDER — CLINDAMYCIN PHOSPHATE 900 MG/50ML
900 INJECTION, SOLUTION INTRAVENOUS EVERY 8 HOURS
Qty: 300 ML | Refills: 0 | Status: DISCONTINUED | OUTPATIENT
Start: 2024-03-27 | End: 2024-03-29

## 2024-03-27 RX ORDER — FENTANYL CITRATE 50 UG/ML
INJECTION, SOLUTION INTRAMUSCULAR; INTRAVENOUS AS NEEDED
Status: DISCONTINUED | OUTPATIENT
Start: 2024-03-27 | End: 2024-03-27 | Stop reason: SURG

## 2024-03-27 RX ORDER — MIDAZOLAM HYDROCHLORIDE 1 MG/ML
INJECTION INTRAMUSCULAR; INTRAVENOUS AS NEEDED
Status: DISCONTINUED | OUTPATIENT
Start: 2024-03-27 | End: 2024-03-27 | Stop reason: SURG

## 2024-03-27 RX ORDER — ONDANSETRON 2 MG/ML
INJECTION INTRAMUSCULAR; INTRAVENOUS AS NEEDED
Status: DISCONTINUED | OUTPATIENT
Start: 2024-03-27 | End: 2024-03-27 | Stop reason: SURG

## 2024-03-27 RX ORDER — PHENYLEPHRINE HCL IN 0.9% NACL 1 MG/10 ML
SYRINGE (ML) INTRAVENOUS AS NEEDED
Status: DISCONTINUED | OUTPATIENT
Start: 2024-03-27 | End: 2024-03-27 | Stop reason: SURG

## 2024-03-27 RX ORDER — FAMOTIDINE 10 MG/ML
INJECTION, SOLUTION INTRAVENOUS AS NEEDED
Status: DISCONTINUED | OUTPATIENT
Start: 2024-03-27 | End: 2024-03-27 | Stop reason: SURG

## 2024-03-27 RX ORDER — CITRIC ACID/SODIUM CITRATE 334-500MG
30 SOLUTION, ORAL ORAL ONCE
Status: COMPLETED | OUTPATIENT
Start: 2024-03-27 | End: 2024-03-27

## 2024-03-27 RX ORDER — METOCLOPRAMIDE HYDROCHLORIDE 5 MG/ML
INJECTION INTRAMUSCULAR; INTRAVENOUS AS NEEDED
Status: DISCONTINUED | OUTPATIENT
Start: 2024-03-27 | End: 2024-03-27 | Stop reason: SURG

## 2024-03-27 RX ORDER — GENTAMICIN SULFATE 60 MG/50ML
120 INJECTION, SOLUTION INTRAVENOUS EVERY 8 HOURS
Qty: 600 ML | Refills: 0 | Status: DISCONTINUED | OUTPATIENT
Start: 2024-03-27 | End: 2024-03-29

## 2024-03-27 RX ADMIN — BUPIVACAINE HYDROCHLORIDE IN DEXTROSE 1.6 ML: 7.5 INJECTION, SOLUTION SUBARACHNOID at 09:36

## 2024-03-27 RX ADMIN — SODIUM CHLORIDE, POTASSIUM CHLORIDE, SODIUM LACTATE AND CALCIUM CHLORIDE 125 ML/HR: 600; 310; 30; 20 INJECTION, SOLUTION INTRAVENOUS at 21:23

## 2024-03-27 RX ADMIN — CEFAZOLIN 1000 MG: 1 INJECTION, POWDER, FOR SOLUTION INTRAMUSCULAR; INTRAVENOUS at 13:07

## 2024-03-27 RX ADMIN — Medication 5000 UNITS: at 20:45

## 2024-03-27 RX ADMIN — CLINDAMYCIN PHOSPHATE 900 MG: 900 INJECTION, SOLUTION INTRAVENOUS at 10:41

## 2024-03-27 RX ADMIN — GENTAMICIN SULFATE 120 MG: 60 INJECTION, SOLUTION INTRAVENOUS at 21:17

## 2024-03-27 RX ADMIN — GENTAMICIN SULFATE 120 MG: 60 INJECTION, SOLUTION INTRAVENOUS at 03:14

## 2024-03-27 RX ADMIN — CITALOPRAM HYDROBROMIDE 40 MG: 40 TABLET ORAL at 20:45

## 2024-03-27 RX ADMIN — Medication 200 MCG: at 09:41

## 2024-03-27 RX ADMIN — METOCLOPRAMIDE 10 MG: 5 INJECTION, SOLUTION INTRAMUSCULAR; INTRAVENOUS at 10:11

## 2024-03-27 RX ADMIN — GENTAMICIN SULFATE 120 MG: 60 INJECTION, SOLUTION INTRAVENOUS at 11:10

## 2024-03-27 RX ADMIN — FAMOTIDINE 20 MG: 10 INJECTION INTRAVENOUS at 09:30

## 2024-03-27 RX ADMIN — FENTANYL CITRATE 20 MCG: 50 INJECTION, SOLUTION INTRAMUSCULAR; INTRAVENOUS at 09:36

## 2024-03-27 RX ADMIN — INDOMETHACIN 25 MG: 25 CAPSULE ORAL at 08:18

## 2024-03-27 RX ADMIN — CEFAZOLIN 1000 MG: 1 INJECTION, POWDER, FOR SOLUTION INTRAMUSCULAR; INTRAVENOUS at 19:58

## 2024-03-27 RX ADMIN — CLINDAMYCIN PHOSPHATE 900 MG: 900 INJECTION, SOLUTION INTRAVENOUS at 02:29

## 2024-03-27 RX ADMIN — MIDAZOLAM HYDROCHLORIDE 1 MG: 1 INJECTION, SOLUTION INTRAMUSCULAR; INTRAVENOUS at 09:30

## 2024-03-27 RX ADMIN — SODIUM CHLORIDE, POTASSIUM CHLORIDE, SODIUM LACTATE AND CALCIUM CHLORIDE: 600; 310; 30; 20 INJECTION, SOLUTION INTRAVENOUS at 09:40

## 2024-03-27 RX ADMIN — CEFAZOLIN 1000 MG: 1 INJECTION, POWDER, FOR SOLUTION INTRAMUSCULAR; INTRAVENOUS at 04:56

## 2024-03-27 RX ADMIN — INDOMETHACIN 25 MG: 25 CAPSULE ORAL at 17:21

## 2024-03-27 RX ADMIN — PRENATAL VITAMINS-IRON FUMARATE 27 MG IRON-FOLIC ACID 0.8 MG TABLET 1 TABLET: at 20:45

## 2024-03-27 RX ADMIN — INDOMETHACIN 25 MG: 25 CAPSULE ORAL at 20:45

## 2024-03-27 RX ADMIN — INDOMETHACIN 25 MG: 25 CAPSULE ORAL at 00:07

## 2024-03-27 RX ADMIN — INDOMETHACIN 25 MG: 25 CAPSULE ORAL at 04:56

## 2024-03-27 RX ADMIN — ONDANSETRON 4 MG: 2 INJECTION INTRAMUSCULAR; INTRAVENOUS at 09:30

## 2024-03-27 RX ADMIN — CLINDAMYCIN PHOSPHATE 900 MG: 900 INJECTION, SOLUTION INTRAVENOUS at 20:45

## 2024-03-27 RX ADMIN — SODIUM CITRATE AND CITRIC ACID MONOHYDRATE 30 ML: 500; 334 SOLUTION ORAL at 09:20

## 2024-03-27 RX ADMIN — Medication 100 MCG: at 09:48

## 2024-03-27 RX ADMIN — INDOMETHACIN 25 MG: 25 CAPSULE ORAL at 13:07

## 2024-03-27 RX ADMIN — ONDANSETRON 4 MG: 2 INJECTION INTRAMUSCULAR; INTRAVENOUS at 10:11

## 2024-03-27 NOTE — ANESTHESIA PROCEDURE NOTES
Spinal Block      Patient reassessed immediately prior to procedure    Patient location during procedure: OR  Indication:at surgeon's request  Performed By  CRNA/ALYSSIA: [place] CRNA  Preanesthetic Checklist  Completed: patient identified, IV checked, site marked, risks and benefits discussed, surgical consent, monitors and equipment checked, pre-op evaluation and timeout performed  Spinal Block Prep:  Patient Position:sitting  Sterile Tech:cap, gloves, mask and sterile barriers  Prep:Betadine  Patient Monitoring:blood pressure monitoring, continuous pulse oximetry and EKG    Spinal Block Procedure  Approach:midline  Guidance:palpation technique  Location:L3-L4  Needle Type:Russell  Needle Gauge:25 G  Placement of Spinal needle event:cerebrospinal fluid aspirated  Paresthesia: no  Fluid Appearance:clear     Post Assessment  Patient Tolerance:patient tolerated the procedure well with no apparent complications  Complications no

## 2024-03-27 NOTE — PROGRESS NOTES
"    Maternal/Fetal Medicine Antepartum Progress Note     Name: Ninfa Jackson    : 1990     MRN: [id number]     Referring Provider: Nelida Olmstead MD    HD#: 2    Hospital Course:   Ninfa Jackson is a 33 y.o.  20w2d admitted with cervical shortening/dilation   Overall well. No overnight events   No LOF, VB, CTX     SHIRIN: Estimated Date of Delivery: 24     ROS:   As noted in HPI.     Objective     Vital Signs  Temp:  [97.7 °F (36.5 °C)-98.9 °F (37.2 °C)] 98.8 °F (37.1 °C)   Temp src: Oral   BP: ()/(54-83) 93/58   Heart Rate:  [] 67   Resp:  [16-18] 16   SpO2:  [98 %-100 %] 100 %                   Flowsheet Rows      Flowsheet Row First Filed Value   Admission Height 170.2 cm (67\") Documented at 2024 1535   Admission Weight 127 kg (280 lb) Documented at 2024 1535            Intake/Output last 24 hours:      Intake/Output Summary (Last 24 hours) at 3/27/2024 1130  Last data filed at 3/27/2024 1110  Gross per 24 hour   Intake 950 ml   Output 150 ml   Net 800 ml       Intake/Output this shift:    I/O this shift:  In: 950 [I.V.:800; IV Piggyback:150]  Out: 150 [Urine:150]      Exam     NAD   Abd soft, NT         Medications (current):       ceFAZolin, 1,000 mg, Intravenous, Q8H  citalopram, 40 mg, Oral, Nightly  clindamycin, 900 mg, Intravenous, Q8H  gentamicin, 120 mg, Intravenous, Q8H  indomethacin, 25 mg, Oral, Q4H  prenatal vitamin, 1 tablet, Oral, Nightly  sodium chloride, 10 mL, Intravenous, Q12H  cholecalciferol, 5,000 Units, Oral, Nightly         acetaminophen    bisacodyl    docusate sodium    lactated ringers    lidocaine PF 1%    ondansetron ODT **OR** ondansetron    oxyCODONE    sodium chloride    sodium chloride      Assessment and Plan     Ninfa Jackson is a 33 y.o.  20w2d with cervical insufficiency   No evidence of abruption, chorioamnionitis or labor   Reviewed plan for exam indicated cerclage this AM   Questions solicited and answered         I " spent 15 minutes caring for the patient on the day of service. This included: obtaining or reviewing a separately obtained medical history, reviewing patient records, performing a medically appropriate exam and/or evaluation, counseling or educating the patient/family/caregiver, ordering medications, labs, and/or procedures and documenting such in the medical record. This does not include time spent on review and interpretation of other tests such as fetal ultrasound or the performance of other procedures such as amniocentesis or CVS.    Yaritza Echevarria MD FACOG  Maternal Fetal Medicine, [facility]     2024

## 2024-03-27 NOTE — ANESTHESIA POSTPROCEDURE EVALUATION
Patient: Ninfa Jackson    Procedure Summary       Date: 03/27/24 Room / Location: [facility] LABOR DELIVERY 2 /  [facility] LABOR DELIVERY    Anesthesia Start: 0928 Anesthesia Stop: 1017    Procedure: CERVICAL CERCLAGE, VAGINAL (Vagina) Diagnosis:     Surgeons: Yaritza Echevarria MD Provider: Jeannie Begum DO    Anesthesia Type: spinal, ITN ASA Status: 3            Anesthesia Type: spinal, ITN    Vitals  Vitals Value Taken Time   /61 03/27/24 1013   Temp 97.7    Pulse 79 03/27/24 1018   Resp 16    SpO2 99 % 03/27/24 1018   Vitals shown include unfiled device data.        Post Anesthesia Care and Evaluation    Patient location during evaluation: bedside  Patient participation: complete - patient participated  Level of consciousness: awake and alert  Pain management: adequate    Airway patency: patent  Anesthetic complications: No anesthetic complications    Cardiovascular status: acceptable  Respiratory status: acceptable  Hydration status: acceptable

## 2024-03-27 NOTE — OP NOTE
Operative Note    Patient name: Ninfa Jackson  YOB: 1990   MRN: [id number]  Admission Date: 3/25/2024  Referring Provider: Nelida Olmstead MD    ID: 33 y.o.  at 20w2d    Preoperative Diagnosis:     Cervical insufficiency     Postoperative Diagnosis:     Same     Indication for Procedure:     Prior 18 week PPROM   Cervical shortening   Failed vaginal progesterone     Procedure(s):     [place]iver[place] Cerclage     Surgeons: Surgeons and Role:     * Yaritza Echevarria MD - Primary    Anesthesia: Spinal    Quantified Blood Loss: minimal mL    Urine Output: 125 mL     Preoperative antibiotic: Ancef, Gent, Clinda               Drains: Gardner catheter to gravity    Complications: None    Condition: Stable to recovery room    Findings:   Cervix approximately 3cms dilated with membranes flush with the external os. The anterior lip of the cervix approximately 4cms in length, the posterior was < 1 cm. There appears to be a defect in this area.    Cerclage placed with 5mm Mersilene tape and knot tied at 12:00     Operative Summary:   After obtaining informed consent the patient was taken to the operating room where adequate anesthesia was obtained.   She was then placed in dorsal lithotomy position in candy cane stirrups. She was prepped and draped in a normal sterile fashion. A surgical time out was performed.    A weighted speculum was then placed. The cervix was grasped gently anteriorly and posteriorly with ring forceps. An anterior and sidewall right angle retractor were placed. The cerclage was placed using 5mm Mirsilene tape moving counterclockwise around the cervix. Four throws were used. The knot was tied at 12:00.    All instruments were removed from the vagina. Surgical field noted to be hemostatic. Bladder was drained by red rubber catheter for 150 ml clear urine.    Patient taken to recovery room in stable condition.

## 2024-03-28 PROCEDURE — 25010000002 CEFAZOLIN PER 500 MG: Performed by: OBSTETRICS & GYNECOLOGY

## 2024-03-28 PROCEDURE — 25010000002 GENTAMICIN PER 80 MG: Performed by: OBSTETRICS & GYNECOLOGY

## 2024-03-28 PROCEDURE — 25010000002 CLINDAMYCIN 900 MG/50ML SOLUTION: Performed by: OBSTETRICS & GYNECOLOGY

## 2024-03-28 PROCEDURE — 25810000003 LACTATED RINGERS PER 1000 ML: Performed by: OBSTETRICS & GYNECOLOGY

## 2024-03-28 PROCEDURE — 99231 SBSQ HOSP IP/OBS SF/LOW 25: CPT | Performed by: OBSTETRICS & GYNECOLOGY

## 2024-03-28 RX ADMIN — INDOMETHACIN 25 MG: 25 CAPSULE ORAL at 05:02

## 2024-03-28 RX ADMIN — INDOMETHACIN 25 MG: 25 CAPSULE ORAL at 17:01

## 2024-03-28 RX ADMIN — GENTAMICIN SULFATE 120 MG: 60 INJECTION, SOLUTION INTRAVENOUS at 20:47

## 2024-03-28 RX ADMIN — Medication 10 ML: at 20:49

## 2024-03-28 RX ADMIN — CLINDAMYCIN PHOSPHATE 900 MG: 900 INJECTION, SOLUTION INTRAVENOUS at 12:20

## 2024-03-28 RX ADMIN — INDOMETHACIN 25 MG: 25 CAPSULE ORAL at 00:01

## 2024-03-28 RX ADMIN — INDOMETHACIN 25 MG: 25 CAPSULE ORAL at 09:19

## 2024-03-28 RX ADMIN — CLINDAMYCIN PHOSPHATE 900 MG: 900 INJECTION, SOLUTION INTRAVENOUS at 19:59

## 2024-03-28 RX ADMIN — CLINDAMYCIN PHOSPHATE 900 MG: 900 INJECTION, SOLUTION INTRAVENOUS at 04:34

## 2024-03-28 RX ADMIN — CEFAZOLIN 1000 MG: 1 INJECTION, POWDER, FOR SOLUTION INTRAMUSCULAR; INTRAVENOUS at 11:44

## 2024-03-28 RX ADMIN — PRENATAL VITAMINS-IRON FUMARATE 27 MG IRON-FOLIC ACID 0.8 MG TABLET 1 TABLET: at 20:48

## 2024-03-28 RX ADMIN — INDOMETHACIN 25 MG: 25 CAPSULE ORAL at 13:17

## 2024-03-28 RX ADMIN — GENTAMICIN SULFATE 120 MG: 60 INJECTION, SOLUTION INTRAVENOUS at 13:17

## 2024-03-28 RX ADMIN — INDOMETHACIN 25 MG: 25 CAPSULE ORAL at 20:48

## 2024-03-28 RX ADMIN — SODIUM CHLORIDE, POTASSIUM CHLORIDE, SODIUM LACTATE AND CALCIUM CHLORIDE 125 ML/HR: 600; 310; 30; 20 INJECTION, SOLUTION INTRAVENOUS at 06:51

## 2024-03-28 RX ADMIN — CEFAZOLIN 1000 MG: 1 INJECTION, POWDER, FOR SOLUTION INTRAMUSCULAR; INTRAVENOUS at 04:06

## 2024-03-28 RX ADMIN — GENTAMICIN SULFATE 120 MG: 60 INJECTION, SOLUTION INTRAVENOUS at 05:03

## 2024-03-28 RX ADMIN — CEFAZOLIN 1000 MG: 1 INJECTION, POWDER, FOR SOLUTION INTRAMUSCULAR; INTRAVENOUS at 19:22

## 2024-03-28 RX ADMIN — Medication 5000 UNITS: at 20:48

## 2024-03-28 RX ADMIN — CITALOPRAM HYDROBROMIDE 40 MG: 40 TABLET ORAL at 20:48

## 2024-03-28 RX ADMIN — SODIUM CHLORIDE, POTASSIUM CHLORIDE, SODIUM LACTATE AND CALCIUM CHLORIDE 125 ML/HR: 600; 310; 30; 20 INJECTION, SOLUTION INTRAVENOUS at 17:01

## 2024-03-28 NOTE — PROGRESS NOTES
Maternal/Fetal Medicine Antepartum Progress Note     Name: Ninfa Jackson    : 1990     MRN: [id number]     Referring Provider: Nelida Olmstead MD    HD#:4    Hospital Course:   Ninfa Jackson is a 33 y.o.  20w3d POD1 s/p exam indicated cerclage in the setting of cervical insufficiency   Patient sleeping - did not wake. Will re-visit later   No overnight events   Continues on antibiotics and indocin     SHIRIN: Estimated Date of Delivery: 24     ROS:   As noted in HPI.     Objective     Vital Signs  Temp:  [98 °F (36.7 °C)-98.6 °F (37 °C)] 98.2 °F (36.8 °C)   Temp src: Oral   BP: (123-132)/(66-82) 123/66   Heart Rate:  [82-91] 86   Resp:  [14-20] 20           Sleeping.         CBC w/ diff:   Lab Results   Component Value Date    WBC 9.48 2024    NEUTRORELPCT 70.9 2024    AUTOIGPER 1.3 (H) 2024    LYMPHORELPCT 19.2 (L) 2024    MONORELPCT 7.9 2024    EOSRELPCT 0.4 2024    BASORELPCT 0.3 2024    HGB 12.9 2024    HCT 38.5 2024    MCV 88.3 2024    RDW 13.0 2024     2024       Imaging:  No new imaging       Medications (current):       ceFAZolin, 1,000 mg, Intravenous, Q8H  citalopram, 40 mg, Oral, Nightly  clindamycin, 900 mg, Intravenous, Q8H  gentamicin, 120 mg, Intravenous, Q8H  indomethacin, 25 mg, Oral, Q4H  prenatal vitamin, 1 tablet, Oral, Nightly  sodium chloride, 10 mL, Intravenous, Q12H  cholecalciferol, 5,000 Units, Oral, Nightly         acetaminophen    bisacodyl    docusate sodium    lactated ringers    lidocaine PF 1%    ondansetron ODT **OR** ondansetron    oxyCODONE    sodium chloride    sodium chloride      Assessment and Plan     Ninfa Jackson is a 33 y.o.  20w3d POD1 s/p exam indicated cerclage   Stable per chart review   Patient asleep - will re-visit later in day   Plan for now to d/c toco, continue abx and indocin x 48 hours post surgery (tomorrow AM)   Okay for out of bed to bathroom,  ambulate         I spent 20 minutes caring for the patient on the day of service. This included: obtaining or reviewing a separately obtained medical history, reviewing patient records, performing a medically appropriate exam and/or evaluation, counseling or educating the patient/family/caregiver, ordering medications, labs, and/or procedures and documenting such in the medical record. This does not include time spent on review and interpretation of other tests such as fetal ultrasound or the performance of other procedures such as amniocentesis or CVS.    Yaritza Echevarria MD FACOG  Maternal Fetal Medicine, [facility]     2024

## 2024-03-28 NOTE — PLAN OF CARE
Problem: Adult Inpatient Plan of Care  Goal: Plan of Care Review  Outcome: Ongoing, Progressing  Goal: Patient-Specific Goal (Individualized)  Outcome: Ongoing, Progressing  Goal: Absence of Hospital-Acquired Illness or Injury  Outcome: Ongoing, Progressing  Intervention: Identify and Manage Fall Risk  Recent Flowsheet Documentation  Taken 3/28/2024 0815 by Tara Mccauley RN  Safety Promotion/Fall Prevention: safety round/check completed  Intervention: Prevent Skin Injury  Recent Flowsheet Documentation  Taken 3/28/2024 0815 by Tara Mccauley RN  Body Position: position changed independently  Intervention: Prevent and Manage VTE (Venous Thromboembolism) Risk  Recent Flowsheet Documentation  Taken 3/28/2024 0815 by Tara Mccauley RN  Activity Management: up ad monique  VTE Prevention/Management:   bilateral   compression stockings off   sequential compression devices off   patient refused intervention  Goal: Optimal Comfort and Wellbeing  Outcome: Ongoing, Progressing  Intervention: Provide Person-Centered Care  Recent Flowsheet Documentation  Taken 3/28/2024 0815 by Tara Mccauley RN  Trust Relationship/Rapport:   care explained   choices provided   emotional support provided   empathic listening provided   questions answered   questions encouraged   reassurance provided   thoughts/feelings acknowledged  Goal: Readiness for Transition of Care  Outcome: Ongoing, Progressing     Problem: Maternal-Fetal Wellbeing  Goal: Optimal Maternal-Fetal Wellbeing  Outcome: Ongoing, Progressing  Intervention: Support Psychosocial Response to Complications During Pregnancy  Recent Flowsheet Documentation  Taken 3/28/2024 0815 by Tara Mccauley RN  Family/Support System Care: support provided     Problem: Bleeding (Cervical Cerclage)  Goal: Absence of Bleeding  Outcome: Ongoing, Progressing     Problem: Bowel Motility Impaired (Cervical Cerclage)  Goal: Effective Bowel Elimination  Outcome: Ongoing,  Progressing     Problem: Change in Maternal-Fetal Status (Cervical Cerclage)  Goal: Optimal Maternal and Fetal Wellbeing  Outcome: Ongoing, Progressing     Problem: Fluid and Electrolyte Imbalance (Cervical Cerclage)  Goal: Fluid and Electrolyte Balance  Outcome: Ongoing, Progressing     Problem: Infection (Cervical Cerclage)  Goal: Absence of Infection Signs and Symptoms  Outcome: Ongoing, Progressing     Problem: Ongoing Anesthesia Effects (Cervical Cerclage)  Goal: Anesthesia/Sedation Recovery  Outcome: Ongoing, Progressing  Intervention: Optimize Anesthesia Recovery  Recent Flowsheet Documentation  Taken 3/28/2024 0815 by Tara Mccauley RN  Safety Promotion/Fall Prevention: safety round/check completed     Problem: Pain (Cervical Cerclage)  Goal: Acceptable Pain Control  Outcome: Ongoing, Progressing     Problem: Postoperative Nausea and Vomiting (Cervical Cerclage)  Goal: Nausea and Vomiting Relief  Outcome: Ongoing, Progressing     Problem: Postoperative Urinary Retention (Cervical Cerclage)  Goal: Effective Urinary Elimination  Outcome: Ongoing, Progressing     Problem: Respiratory Compromise (Cervical Cerclage)  Goal: Effective Oxygenation and Ventilation  Outcome: Ongoing, Progressing   Goal Outcome Evaluation:

## 2024-03-29 VITALS
WEIGHT: 280 LBS | TEMPERATURE: 98 F | RESPIRATION RATE: 16 BRPM | HEART RATE: 71 BPM | HEIGHT: 67 IN | SYSTOLIC BLOOD PRESSURE: 123 MMHG | DIASTOLIC BLOOD PRESSURE: 76 MMHG | BODY MASS INDEX: 43.95 KG/M2 | OXYGEN SATURATION: 100 %

## 2024-03-29 PROCEDURE — 25010000002 GENTAMICIN PER 80 MG: Performed by: OBSTETRICS & GYNECOLOGY

## 2024-03-29 PROCEDURE — 25010000002 ONDANSETRON PER 1 MG: Performed by: OBSTETRICS & GYNECOLOGY

## 2024-03-29 PROCEDURE — 25010000002 CLINDAMYCIN 900 MG/50ML SOLUTION: Performed by: OBSTETRICS & GYNECOLOGY

## 2024-03-29 PROCEDURE — 25010000002 CEFAZOLIN PER 500 MG: Performed by: OBSTETRICS & GYNECOLOGY

## 2024-03-29 PROCEDURE — 25810000003 LACTATED RINGERS PER 1000 ML: Performed by: OBSTETRICS & GYNECOLOGY

## 2024-03-29 PROCEDURE — 99238 HOSP IP/OBS DSCHRG MGMT 30/<: CPT | Performed by: OBSTETRICS & GYNECOLOGY

## 2024-03-29 RX ORDER — L.ACID,PARA/B.BIFIDUM/S.THERM 8B CELL
1 CAPSULE ORAL DAILY
Status: DISCONTINUED | OUTPATIENT
Start: 2024-03-29 | End: 2024-03-29 | Stop reason: HOSPADM

## 2024-03-29 RX ADMIN — GENTAMICIN SULFATE 120 MG: 60 INJECTION, SOLUTION INTRAVENOUS at 04:47

## 2024-03-29 RX ADMIN — INDOMETHACIN 25 MG: 25 CAPSULE ORAL at 00:22

## 2024-03-29 RX ADMIN — INDOMETHACIN 25 MG: 25 CAPSULE ORAL at 04:47

## 2024-03-29 RX ADMIN — SODIUM CHLORIDE, POTASSIUM CHLORIDE, SODIUM LACTATE AND CALCIUM CHLORIDE 125 ML/HR: 600; 310; 30; 20 INJECTION, SOLUTION INTRAVENOUS at 00:22

## 2024-03-29 RX ADMIN — CEFAZOLIN 1000 MG: 1 INJECTION, POWDER, FOR SOLUTION INTRAMUSCULAR; INTRAVENOUS at 03:32

## 2024-03-29 RX ADMIN — Medication 1 CAPSULE: at 08:51

## 2024-03-29 RX ADMIN — INDOMETHACIN 25 MG: 25 CAPSULE ORAL at 08:51

## 2024-03-29 RX ADMIN — CLINDAMYCIN PHOSPHATE 900 MG: 900 INJECTION, SOLUTION INTRAVENOUS at 04:07

## 2024-03-29 RX ADMIN — ONDANSETRON 4 MG: 2 INJECTION INTRAMUSCULAR; INTRAVENOUS at 04:42

## 2024-03-29 NOTE — DISCHARGE SUMMARY
Discharge Summary    Patient Name: Ninfa Jackson  : 1990  MRN: [id number]  Date of Service: 3/29/2024  Referring Provider: Nelida Olmstead  Discharge Provider: Yaritza Echevarria MD    Date of Admission: 3/25/2024    Date of Discharge:  3/29/2024         Admission Diagnosis: Incompetent cervix during second trimester, antepartum [O34.32]     Discharge Diagnosis: Same     Procedures: Ontiveros Cerclage     Hospital Course:    Patient admitted on 3/25/24 with suspected cervical insufficiency. Found to have closed cervical length of 2mm. She was started on cefazolin, gentamicin, clindamycin and indocin. She was observed for 24 hours for evidence of labor, chorioamnionitis, abruption. She underwent Ontiveros Cerclage on 3/27/24. She was maintained on triple antibiotics and indocin until 3/29/24. She was ambulating, voiding, tolerating PO and controlled pain. No VB, fever or LOF. She was deemed stable for discharge.     Discharge Condition: Stable    Discharge to: Home    Discharge Medications:      Discharge Medications        ASK your doctor about these medications        Instructions Start Date   citalopram 40 MG tablet  Commonly known as: CeleXA   40 mg, Oral, Daily      Enoxaparin Sodium 30 MG/0.3ML solution prefilled syringe syringe  Commonly known as: LOVENOX   30 mg, Subcutaneous, Daily      ondansetron 4 MG/5ML solution  Commonly known as: ZOFRAN   4 mg, Oral, Once As Needed      prenatal vitamin 27-0.8 27-0.8 MG tablet tablet   Oral, Daily      Progesterone 200 MG capsule  Commonly known as: PROMETRIUM   200 mg, Oral, Daily, Mail order waiting to come in to start       vitamin D3 125 MCG (5000 UT) capsule capsule   5,000 Units, Oral, 2 Times Daily               Discharge Diet: Regular diet     Discharge Activity:   No heavy lifting, limited activity, pelvic rest     Follow up appointments:   1 week with Dr Olmstead   2 weeks with NIRMAL Echevarria MD  3/29/2024 09:40 EDT

## 2024-03-29 NOTE — PLAN OF CARE
Problem: Adult Inpatient Plan of Care  Goal: Plan of Care Review  Outcome: Met  Flowsheets (Taken 3/29/2024 1530)  Progress: improving  Plan of Care Reviewed With:   patient   spouse  Goal: Patient-Specific Goal (Individualized)  Outcome: Met  Goal: Absence of Hospital-Acquired Illness or Injury  Outcome: Met  Intervention: Identify and Manage Fall Risk  Description: Perform standard risk assessment on admission using a validated tool or comprehensive approach appropriate to the patient; reassess fall risk frequently, with change in status or transfer to another level of care.  Communicate fall injury risk to interprofessional healthcare team.  Determine need for increased observation, equipment and environmental modification, such as low bed, signage and supportive, nonskid footwear.  Adjust safety measures to individual developmental age, stage and identified risk factors.  Reinforce the importance of safety and physical activity with patient and family.  Perform regular intentional rounding to assess need for position change, pain assessment and personal needs, including assistance with toileting.  Recent Flowsheet Documentation  Taken 3/29/2024 1530 by Sabrina Short, RN  Safety Promotion/Fall Prevention:   safety round/check completed   assistive device/personal items within reach   clutter free environment maintained   fall prevention program maintained   nonskid shoes/slippers when out of bed  Taken 3/29/2024 1448 by Sabrina Short, RN  Safety Promotion/Fall Prevention:   safety round/check completed   assistive device/personal items within reach   clutter free environment maintained   fall prevention program maintained   nonskid shoes/slippers when out of bed  Taken 3/29/2024 1340 by Sabrina Short, RN  Safety Promotion/Fall Prevention:   safety round/check completed   assistive device/personal items within reach   clutter free environment maintained   fall prevention program maintained   nonskid  shoes/slippers when out of bed  Taken 3/29/2024 1245 by Sabrina Short RN  Safety Promotion/Fall Prevention:   nonskid shoes/slippers when out of bed   clutter free environment maintained   fall prevention program maintained   assistive device/personal items within reach   safety round/check completed  Taken 3/29/2024 1115 by Sabrina Short RN  Safety Promotion/Fall Prevention:   safety round/check completed   clutter free environment maintained   assistive device/personal items within reach   fall prevention program maintained   nonskid shoes/slippers when out of bed  Taken 3/29/2024 1045 by Sabrina Short RN  Safety Promotion/Fall Prevention:   safety round/check completed   assistive device/personal items within reach   clutter free environment maintained   fall prevention program maintained   nonskid shoes/slippers when out of bed  Taken 3/29/2024 0928 by Sabrina Short RN  Safety Promotion/Fall Prevention:   safety round/check completed   assistive device/personal items within reach   clutter free environment maintained   fall prevention program maintained   nonskid shoes/slippers when out of bed  Taken 3/29/2024 0850 by Sabrina Short RN  Safety Promotion/Fall Prevention:   safety round/check completed   assistive device/personal items within reach   clutter free environment maintained   fall prevention program maintained   nonskid shoes/slippers when out of bed  Taken 3/29/2024 0755 by Sabrina Short RN  Safety Promotion/Fall Prevention:   safety round/check completed   assistive device/personal items within reach   clutter free environment maintained   fall prevention program maintained   nonskid shoes/slippers when out of bed  Intervention: Prevent Skin Injury  Description: Perform a screening for skin injury risk, such as pressure or moisture associated skin damage on admission and at regular intervals throughout hospital stay.  Keep all areas of skin (especially folds) clean and dry.  Maintain  adequate skin hydration.  Relieve and redistribute pressure and protect bony prominences; implement measures based on patient-specific risk factors.  Match turning and repositioning schedule to clinical condition.  Encourage weight shift frequently; assist with reposition if unable to complete independently.  Float heels off bed; avoid pressure on the Achilles tendon.  Keep skin free from extended contact with medical devices.  Encourage functional activity and mobility, as early as tolerated.  Use aids (e.g., slide boards, mechanical lift) during transfer.  Recent Flowsheet Documentation  Taken 3/29/2024 0755 by Sabrina Short RN  Body Position:   side-lying   right  Skin Protection:   adhesive use limited   transparent dressing maintained  Intervention: Prevent and Manage VTE (Venous Thromboembolism) Risk  Description: Assess for VTE (venous thromboembolism) risk.  Encourage and assist with early ambulation.  Initiate and maintain compression or other therapy, as indicated, based on identified risk in accordance with organizational protocol and provider order.  Encourage both active and passive leg exercises while in bed, if unable to ambulate.  Recent Flowsheet Documentation  Taken 3/29/2024 0755 by Sabrina Short RN  Activity Management: up ad monique  VTE Prevention/Management:   bilateral   sequential compression devices on  Intervention: Prevent Infection  Description: Maintain skin and mucous membrane integrity; promote hand, oral and pulmonary hygiene.  Optimize fluid balance, nutrition, sleep and glycemic control to maximize infection resistance.  Identify potential sources of infection early to prevent or mitigate progression of infection (e.g., wound, lines, devices).  Evaluate ongoing need for invasive devices; remove promptly when no longer indicated.  Recent Flowsheet Documentation  Taken 3/29/2024 1530 by Sabrina Short RN  Infection Prevention:   single patient room provided   personal protective  equipment utilized   hand hygiene promoted  Taken 3/29/2024 1448 by Sabrina Short RN  Infection Prevention:   personal protective equipment utilized   hand hygiene promoted   single patient room provided  Taken 3/29/2024 1340 by Sabrina Short RN  Infection Prevention:   personal protective equipment utilized   single patient room provided   hand hygiene promoted  Taken 3/29/2024 1245 by Sabrina Short RN  Infection Prevention:   personal protective equipment utilized   single patient room provided   hand hygiene promoted  Taken 3/29/2024 1115 by Sabrina Short RN  Infection Prevention:   personal protective equipment utilized   hand hygiene promoted   single patient room provided  Taken 3/29/2024 1045 by Sabrina Short RN  Infection Prevention:   personal protective equipment utilized   single patient room provided   hand hygiene promoted  Taken 3/29/2024 0928 by Sabrina Short RN  Infection Prevention:   personal protective equipment utilized   single patient room provided   hand hygiene promoted  Taken 3/29/2024 0850 by Sabrina Short RN  Infection Prevention:   personal protective equipment utilized   single patient room provided   hand hygiene promoted  Taken 3/29/2024 0755 by Sabrina Short RN  Infection Prevention:   personal protective equipment utilized   single patient room provided   hand hygiene promoted  Goal: Optimal Comfort and Wellbeing  Outcome: Met  Intervention: Monitor Pain and Promote Comfort  Description: Assess pain level, treatment efficacy and patient response at regular intervals using a consistent pain scale.  Consider the presence and impact of preexisting chronic pain.  Encourage patient and caregiver involvement in pain assessment, interventions and safety measures.  Recent Flowsheet Documentation  Taken 3/29/2024 1045 by Sabrina Short RN  Pain Management Interventions:   position adjusted   pillow support provided  Taken 3/29/2024 0928 by Sabrina Short RN  Pain Management  Interventions:   pillow support provided   position adjusted  Taken 3/29/2024 0850 by Sabrina Short RN  Pain Management Interventions:   pillow support provided   position adjusted  Taken 3/29/2024 0755 by Sabrina Short RN  Pain Management Interventions:   position adjusted   pillow support provided  Intervention: Provide Person-Centered Care  Description: Use a family-focused approach to care.  Develop trust and rapport by proactively providing information, encouraging questions, addressing concerns and offering reassurance.  Acknowledge emotional response to hospitalization.  Recognize and utilize personal coping strategies.  Honor spiritual and cultural preferences.  Recent Flowsheet Documentation  Taken 3/29/2024 0755 by Sabrina Short RN  Trust Relationship/Rapport:   care explained   choices provided   questions answered   questions encouraged   reassurance provided   thoughts/feelings acknowledged   empathic listening provided   emotional support provided  Goal: Readiness for Transition of Care  Outcome: Met  Intervention: Mutually Develop Transition Plan  Description: Identify available resources for support (e.g., family, friends, community).  Identify and address barriers to ongoing treatment and home management (e.g., environmental, financial).  Provide opportunities to practice self-management skills.  Assess and monitor emotional readiness for transition.  Establish or reconnect linkage with outpatient providers or community-based services.  Recent Flowsheet Documentation  Taken 3/29/2024 1451 by Sabrina Short RN  Equipment Needed After Discharge: none  Equipment Currently Used at Home: none  Anticipated Changes Related to Illness: none  Transportation Anticipated: family or friend will provide  Transportation Concerns: none  Concerns to be Addressed: no discharge needs identified  Readmission Within the Last 30 Days: no previous admission in last 30 days  Patient/Family Anticipated Services at  Transition: none  Patient/Family Anticipates Transition to: home with family     Problem: Maternal-Fetal Wellbeing  Goal: Optimal Maternal-Fetal Wellbeing  Outcome: Met  Intervention: Support Psychosocial Response to Complications During Pregnancy  Description: Acknowledge, normalize and validate difficulty managing major lifestyle changes.  Recognize the role maternal stress plays in pregnancy complications.  Provide opportunity for expression of concerns regarding maternal and fetal wellbeing.  Use a family-focused approach to care; emphasize importance of support system for entire family.  Discuss and prepare for risk of maternal-fetal adverse outcome.  Honor and incorporate cultural beliefs, rituals and practices.  Assist patient with stress-reduction techniques (e.g., journaling, verbalization, relaxation techniques, problem-solving).  Recognize current coping strategies; aid in developing new strategies.  Empower patient to “take control” of situation through self-care management and integration.  Monitor patient perspective regarding quality of life and pregnancy.  Assess and monitor for signs and symptoms of psychosocial concerns (e.g., depression, anxiety).  Provide information on resources for additional information.  Recent Flowsheet Documentation  Taken 3/29/2024 8020 by Sabrina Short RN  Family/Support System Care:   involvement promoted   presence promoted   support provided     Problem: Bleeding (Cervical Cerclage)  Goal: Absence of Bleeding  Outcome: Met     Problem: Bowel Motility Impaired (Cervical Cerclage)  Goal: Effective Bowel Elimination  Outcome: Met     Problem: Change in Maternal-Fetal Status (Cervical Cerclage)  Goal: Optimal Maternal and Fetal Wellbeing  Outcome: Met     Problem: Fluid and Electrolyte Imbalance (Cervical Cerclage)  Goal: Fluid and Electrolyte Balance  Outcome: Met     Problem: Infection (Cervical Cerclage)  Goal: Absence of Infection Signs and Symptoms  Outcome:  Met  Intervention: Prevent or Manage Infection  Description: Optimize activity and mobility to maximize infection resistance (e.g., reposition, sit in chair, ambulate).  Maintain normothermia and glycemic control to maximize infection resistance.  If the transabdominal approach is used, maintain dressing integrity to reduce the risk for infection; inspect incision as visible.  Implement transmission-based precautions and isolation, as indicated, to prevent spread of infection.  Discontinue prophylactic antimicrobial agent within 24 hours after procedure, as directed.  Identify early signs of sepsis, such as increased heart rate and decreased blood pressure, as well as changes in mental state, respiratory pattern or peripheral perfusion.  Prepare for rapid sepsis management, including lactate level, intravenous access, fluid administration and oxygen therapy.  Provide fever-reduction and comfort measures.  Recent Flowsheet Documentation  Taken 3/29/2024 0755 by Sabrina Short RN  Infection Management: aseptic technique maintained     Problem: Ongoing Anesthesia Effects (Cervical Cerclage)  Goal: Anesthesia/Sedation Recovery  Outcome: Met  Intervention: Optimize Anesthesia Recovery  Description: Assess and monitor airway, breathing and circulation; maintain close surveillance for deterioration.  Implement continuous monitoring, such as cardiorespiratory, blood pressure, temperature, pulse oximetry and capnography.  Elevate head of bed, if able; facilitate regular position changes.  Assess neurocognitive function and for risks that may lead to postoperative delirium, such as decreased level of consciousness, pain and agitation; offer reassurance; answer questions.  Assess and monitor neurovascular and neuromuscular function, such as motor strength, tone, posture, peripheral pulses and extremity sensation; protect areas of decreased sensation from heat, cold, medical devices or objects.  Individualize frequency and  intensity of monitoring based on sedation or anesthesia administered, identified risk factors, ongoing assessment and organizational protocol.  Prepare for administration of pharmacologic therapy, such as reversal agent, antiemetic or antipruritic medication, to manage sedation or anesthesia effects.  Adjust environment to maintain safety (e.g., fall precautions, safety equipment).  Recent Flowsheet Documentation  Taken 3/29/2024 1530 by Sabrina Short RN  Safety Promotion/Fall Prevention:   safety round/check completed   assistive device/personal items within reach   clutter free environment maintained   fall prevention program maintained   nonskid shoes/slippers when out of bed  Taken 3/29/2024 1448 by Sabrina Short RN  Safety Promotion/Fall Prevention:   safety round/check completed   assistive device/personal items within reach   clutter free environment maintained   fall prevention program maintained   nonskid shoes/slippers when out of bed  Taken 3/29/2024 1340 by Sabrina Short RN  Safety Promotion/Fall Prevention:   safety round/check completed   assistive device/personal items within reach   clutter free environment maintained   fall prevention program maintained   nonskid shoes/slippers when out of bed  Taken 3/29/2024 1245 by Sabrina Short RN  Safety Promotion/Fall Prevention:   nonskid shoes/slippers when out of bed   clutter free environment maintained   fall prevention program maintained   assistive device/personal items within reach   safety round/check completed  Taken 3/29/2024 1115 by Sabrina Short RN  Safety Promotion/Fall Prevention:   safety round/check completed   clutter free environment maintained   assistive device/personal items within reach   fall prevention program maintained   nonskid shoes/slippers when out of bed  Taken 3/29/2024 1045 by Sabrina Short RN  Safety Promotion/Fall Prevention:   safety round/check completed   assistive device/personal items within reach   clutter  free environment maintained   fall prevention program maintained   nonskid shoes/slippers when out of bed  Taken 3/29/2024 0928 by Sabrina Short RN  Safety Promotion/Fall Prevention:   safety round/check completed   assistive device/personal items within reach   clutter free environment maintained   fall prevention program maintained   nonskid shoes/slippers when out of bed  Taken 3/29/2024 0850 by Sabrina Short RN  Safety Promotion/Fall Prevention:   safety round/check completed   assistive device/personal items within reach   clutter free environment maintained   fall prevention program maintained   nonskid shoes/slippers when out of bed  Taken 3/29/2024 0755 by Sabrina Short RN  Safety Promotion/Fall Prevention:   safety round/check completed   assistive device/personal items within reach   clutter free environment maintained   fall prevention program maintained   nonskid shoes/slippers when out of bed     Problem: Pain (Cervical Cerclage)  Goal: Acceptable Pain Control  Outcome: Met  Intervention: Prevent or Manage Pain  Description: Develop mutual pain management plan with patient and caregiver/family; review regularly.  Combine multimodal analgesia and nonpharmacologic strategies to help potentiate synergistic effects, enhance comfort and improve function (e.g., complementary therapy, diversional activity, mindfulness).  Evaluate risk for opioid use; individualize pharmacologic pain management plan and titrate medication to patient response.  Provide around-the-clock dosing of pain medication to keep pain levels in control.  Manage medication-induced effects, such as respiratory depression, constipation, nausea, vomiting.  Minimize pain stimuli; coordinate care and adjust environment (e.g., light, noise, unnecessary movement); promote sleep/rest for optimal healing.  Recent Flowsheet Documentation  Taken 3/29/2024 1045 by Sabrina Short RN  Pain Management Interventions:   position adjusted   pillow  support provided  Taken 3/29/2024 0928 by Sabrina Short RN  Pain Management Interventions:   pillow support provided   position adjusted  Taken 3/29/2024 0850 by Sabrina Short RN  Pain Management Interventions:   pillow support provided   position adjusted  Taken 3/29/2024 0755 by Sabrina Short RN  Pain Management Interventions:   position adjusted   pillow support provided  Diversional Activities:   smartphone   television     Problem: Postoperative Nausea and Vomiting (Cervical Cerclage)  Goal: Nausea and Vomiting Relief  Outcome: Met     Problem: Postoperative Urinary Retention (Cervical Cerclage)  Goal: Effective Urinary Elimination  Outcome: Met     Problem: Respiratory Compromise (Cervical Cerclage)  Goal: Effective Oxygenation and Ventilation  Outcome: Met  Intervention: Optimize Oxygenation and Ventilation  Description: Recognize risk for obstructive sleep apnea; anticipate the need for continuous pulse oximetry and noninvasive positive pressure ventilation.  Maintain patent airway with positioning, airway adjuncts, secretion clearance.  Encourage pulmonary hygiene, such as cough-enhancement and airway-clearance techniques, that may include use of incentive spirometry, deep breathing and cough.  Anticipate the need for splinting with cough to minimize discomfort; assist if needed.  Provide oxygen therapy judiciously, if hypoxemia present.  Consider pharmacologic therapy that may improve mucus clearance and reduce bronchospasm, laryngospasm, swelling or stridor.  Consider the need for intubation and invasive positive pressure ventilation for longer recovery needs; use lung protective measures.  Recent Flowsheet Documentation  Taken 3/29/2024 0755 by Sabrina Short RN  Head of Bed (HOB) Positioning: HOB at 30-45 degrees   Goal Outcome Evaluation:  Plan of Care Reviewed With: patient, spouse        Progress: improving

## 2024-03-29 NOTE — PAYOR COMM NOTE
"Ninfa Jackson (33 y.o. Female) [id number]      Date of Birth   1990    Social Security Number       Address   [address] [place], [place] [address] [place] [address]    Home Phone   [phone]    MRN   [id number]       Mu-ism   None    Marital Status                               Admission Date   3/25/24    Admission Type   Elective    Admitting Provider   Aung Rajan DO    Attending Provider       Department, Room/Bed   [facility] ANTEPARTUM, N332/1       Discharge Date   3/29/2024    Discharge Disposition   Home or Self Care    Discharge Destination                                 Attending Provider: (none)   Allergies: Penicillins    Isolation: None   Infection: None   Code Status: Prior    Ht: 170.2 cm (67\")   Wt: 127 kg (280 lb)    Admission Cmt: None   Principal Problem: Incompetent cervix during second trimester, antepartum [O34.32]                   Active Insurance as of 3/25/2024       Primary Coverage       Payor Plan Insurance Group Employer/Plan Group    [employer] [facility]       Payor Plan Address Payor Plan Phone Number Payor Plan Fax Number Effective Dates    [address]   3/18/2018 - None Entered    [place] [address]         Subscriber Name Subscriber Birth Date Member ID       NINFA JACKSON 1990 [id number]                     Emergency Contacts        (Rel.) Home Phone Work Phone Mobile Phone    CARMEN JACKSON (Spouse) [phone] -- [phone]              Insurance Information                  [employer] Phone: --    Subscriber: Ninfa Jackson Subscriber#: [id number]    Group#: [id number] Precert#: [id number]             Discharge Summary        Yaritza Echevarria MD at 24 0939          Discharge Summary    Patient Name: Ninfa Jackson  : 1990  MRN: [id number]  Date of Service: 3/29/2024  Referring Provider: Nelida Olmstead  Discharge Provider: Yaritza Echevarria MD    Date of " Admission: 3/25/2024    Date of Discharge:  3/29/2024         Admission Diagnosis: Incompetent cervix during second trimester, antepartum [O34.32]     Discharge Diagnosis: Same     Procedures: Ontiveros Cerclage     Hospital Course:    Patient admitted on 3/25/24 with suspected cervical insufficiency. Found to have closed cervical length of 2mm. She was started on cefazolin, gentamicin, clindamycin and indocin. She was observed for 24 hours for evidence of labor, chorioamnionitis, abruption. She underwent Ontiveros Cerclage on 3/27/24. She was maintained on triple antibiotics and indocin until 3/29/24. She was ambulating, voiding, tolerating PO and controlled pain. No VB, fever or LOF. She was deemed stable for discharge.     Discharge Condition: Stable    Discharge to: Home    Discharge Medications:      Discharge Medications        ASK your doctor about these medications        Instructions Start Date   citalopram 40 MG tablet  Commonly known as: CeleXA   40 mg, Oral, Daily      Enoxaparin Sodium 30 MG/0.3ML solution prefilled syringe syringe  Commonly known as: LOVENOX   30 mg, Subcutaneous, Daily      ondansetron 4 MG/5ML solution  Commonly known as: ZOFRAN   4 mg, Oral, Once As Needed      prenatal vitamin 27-0.8 27-0.8 MG tablet tablet   Oral, Daily      Progesterone 200 MG capsule  Commonly known as: PROMETRIUM   200 mg, Oral, Daily, Mail order waiting to come in to start       vitamin D3 125 MCG (5000 UT) capsule capsule   5,000 Units, Oral, 2 Times Daily               Discharge Diet: Regular diet     Discharge Activity:   No heavy lifting, limited activity, pelvic rest     Follow up appointments:   1 week with Dr Olmstead   2 weeks with NIRMAL Echevarria MD  3/29/2024 09:40 EDT    Electronically signed by Yaritza Echevarria MD at 03/29/24 0942

## 2024-04-01 NOTE — PAYOR COMM NOTE
"Ninfa Jackson (33 y.o. Female)     [place] Ref#[id number]     Clinical for reconsideration of denial.    From:Estephanie Bob LPN, Utilization Review  Phone #[phone]  Fax #[phone]        Date of Birth   1990    Social Security Number       Address   [address] [place] [address] [address] [place] [address]    Home Phone   [phone]    MRN   [id number]       Yarsani   None    Marital Status                               Admission Date   3/25/24    Admission Type   Elective    Admitting Provider   Aung Rajan DO    Attending Provider       Department, Room/Bed   [facility] ANTEPARTUM, N332/1       Discharge Date   3/29/2024    Discharge Disposition   Home or Self Care    Discharge Destination                                 Attending Provider: (none)   Allergies: Penicillins    Isolation: None   Infection: None   Code Status: Prior    Ht: 170.2 cm (67\")   Wt: 127 kg (280 lb)    Admission Cmt: None   Principal Problem: Incompetent cervix during second trimester, antepartum [O34.32]                   Active Insurance as of 3/25/2024       Primary Coverage       Payor Plan Insurance Group Employer/Plan Group    [employer] [place] [id number]       Payor Plan Address Payor Plan Phone Number Payor Plan Fax Number Effective Dates    [address]   3/18/2018 - None Entered    [place] [address]         Subscriber Name Subscriber Birth Date Member ID       NINFA JACKSON 1990 [id number]                     Emergency Contacts        (Rel.) Home Phone Work Phone Mobile Phone    CARMEN JACKSON (Spouse) [phone] -- [phone]              Insurance Information                  [employer] [employer] Phone: --    Subscriber: Ninfa Jackson Subscriber#: [id number]    Group#: [id number] Precert#: [id number]             History & Physical        Aung Rajan DO at 03/25/24 [address] [place] [address]  Obstetric History and Physical    Referring " "Provider: Nelida Olmstead MD      Cc: Short cervical length    Subjective    Patient is a 33 y.o. female  currently at 20w0d, who presents as a transfer from [facility] for evaluation of possible incompetent cervix.  Presented to primary OB today with complaint of  back pain and menstrual cramping.  Ultrasound revealed short cervix with funneling.  Cervical exam noted be 1 cm per Dr. Olmstead.  Patient denies leaking of fluid, vaginal bleeding, recent trauma, urinary symptoms, fever, any other concerns.  Most recent PDC ultrasound at 18 weeks  revealed normal cervical length.  Obstetrical history significant for pre-PROM at 18 weeks requiring a D&E after failed spontaneous , primary  34 weeks 6 days due to failure to progress, and a first trimester D&E 11 weeks due to aneuploidy.  Patient currently denies abdominal pain any other concerns.        The following portions of the patients history were reviewed and updated as appropriate: current medications, allergies, past medical history, past surgical history, past family history, past social history, and problem list .       Prenatal Information:   Maternal Prenatal Labs  Blood Type No results found for: \"ABO\"   Rh Status No results found for: \"RH\"   Antibody Screen No results found for: \"ABSCRN\"   Gonnorhea No results found for: \"GCCX\"   Chlamydia No results found for: \"CLAMYDCU\"   RPR No results found for: \"RPR\"   Syphilis Antibody No results found for: \"SYPHILIS\"   Rubella No results found for: \"RUBELLAIGGIN\"   Hepatitis B Surface Antigen No results found for: \"HEPBSAG\"   HIV-1 Antibody No results found for: \"LABHIV1\"   Hepatitis C Antibody No results found for: \"HEPCAB\"   Rapid Urin Drug Screen No results found for: \"AMPMETHU\", \"BARBITSCNUR\", \"LABBENZSCN\", \"LABMETHSCN\", \"LABOPIASCN\", \"THCURSCR\", \"COCAINEUR\", \"AMPHETSCREEN\", \"PROPOXSCN\", \"BUPRENORSCNU\", \"METAMPSCNUR\", \"OXYCODONESCN\", \"TRICYCLICSCN\"   Group B Strep Culture No " "results found for: \"GBSANTIGEN\"           External Prenatal Results       Pregnancy Outside Results - Transcribed From Office Records - See Scanned Records For Details       Test Value Date Time    ABO       Rh       Antibody Screen ^ NEG  20 1346    Varicella IgG       Rubella ^ POSITIVE  20 1346    Hgb ^ 13.6 g/dL 22 1656    Hct ^ 41.8 % 22 1656    Glucose Fasting GTT       Glucose Tolerance Test 1 hour       Glucose Tolerance Test 3 hour       Gonorrhea (discrete)       Chlamydia (discrete)       RPR ^ Non-Reactive  20 1346    VDRL       Syphilis Antibody       HBsAg ^ NEGATIVE  20 1346    Herpes Simplex Virus PCR       Herpes Simplex VIrus Culture       HIV       Hep C RNA Quant PCR       Hep C Antibody       AFP       Group B Strep       GBS Susceptibility to Clindamycin       GBS Susceptibility to Erythromycin       Fetal Fibronectin       Genetic Testing, Maternal Blood                 Drug Screening       Test Value Date Time    Urine Drug Screen       Amphetamine Screen       Barbiturate Screen       Benzodiazepine Screen       Methadone Screen       Phencyclidine Screen       Opiates Screen       THC Screen       Cocaine Screen       Propoxyphene Screen       Buprenorphine Screen       Methamphetamine Screen       Oxycodone Screen       Tricyclic Antidepressants Screen                 Legend    ^: Historical                              Past OB History:       OB History    Para Term  AB Living   6 2 0 2 3 1   SAB IAB Ectopic Molar Multiple Live Births   3 0 0 0 0 1      # Outcome Date GA Lbr Kris/2nd Weight Sex Type Anes PTL Lv   6 Current            5 SAB 2023 11w0d   M SAB      4  20 18w2d   F    FD   3 SAB 2019 7w0d    SAB      2  18 34w6d   M CS-Unspec EPI  ADY      Complications: Failure to Progress in Second Stage   1 SAB 2017 8w0d    SAB         Obstetric Comments   FOB #1 Pregnancy #1 SAB at 8 weeks   FOB #1 " Pregnancy #2 P C/S at 34 6/7 weeks, FTP, male   FOB #1 Pregnancy #3 SAB at 7 weeks,   FOB #1 Pregnancy #4 PPROM at 18 weeks, unable to deliver all of fetus, decapitated D&E done   FOB #1 Pregnancy #5 D&E at 11 weeks, Trisomy 15 after demise   FOB #1 Pregnancy #6  current       Past Medical History: Past Medical History:   Diagnosis Date    Anxiety     Depression     Hyperemesis gravidarum       Past Surgical History Past Surgical History:   Procedure Laterality Date     SECTION PRIMARY  2018    FTP    D & C WITH SUCTION      D & E SECOND TRIMESTER      DEEP NECK LYMPH NODE BIOPSY / EXCISION      WISDOM TOOTH EXTRACTION        Family History: No family history on file.   Social History:  reports that she has never smoked. She has never used smokeless tobacco.   reports that she does not currently use alcohol.   reports no history of drug use.                   General ROS Negative Findings:Headaches, Visual Changes, Epigastric pain, Anorexia, Nausia/Vomiting, ROM, and Vaginal Bleeding    ROS     All other systems have been reviewed and are neg  Objective      Vital Signs Range for the last 24 hours  Temperature: Temp:  [98.6 °F (37 °C)] 98.6 °F (37 °C)   Temp Source: Temp src: Oral   BP: BP: (131)/(79) 131/79   Pulse: Heart Rate:  [90] 90   Respirations: Resp:  [18] 18   SPO2: SpO2:  [97 %] 97 %   O2 Amount (l/min):     O2 Devices     Weight: Weight:  [127 kg (280 lb)] 127 kg (280 lb)     Physical Examination:   General:   alert, appears stated age, and cooperative   Skin:   normal   HEENT:     Lungs:   clear to auscultation bilaterally   Heart:   regular rate and rhythm, S1, S2 normal, no murmur, click, rub or gallop   Gastrointestinal: Abdomen soft, gravid uterus nontender, no guarding, no rebound negative CVA tenderness.   Lower Extremities: No edema, no calf tenderness   : Exam deferred.   Musculoskeletal:     Neuro: No focal deficits noted.             Fetal Heart Rate Assessment    Method:     Beats/min:     Baseline:     Varibility:     Accels:     Decels:     Tracing Category:       Uterine Assessment   Method:     Frequency (min):     Ctx Count in 10 min:     Duration:     Intensity:     Intensity by IUPC:     Resting Tone:     Resting Tone by IUPC:     [place] Units:       Laboratory Results:   Lab Results (last 24 hours)       Procedure Component Value Units Date/Time    T Pallidum Antibody w/ reflex RPR (Syphilis) [[id number]] Collected: 24    Specimen: Blood Updated: 24    Preeclampsia Panel [[id number]] Collected: 24    Specimen: Blood Updated: 24    CBC & Differential [[id number]] Collected: 24    Specimen: Blood Updated: 24    Narrative:      The following orders were created for panel order CBC & Differential.  Procedure                               Abnormality         Status                     ---------                               -----------         ------                     CBC Auto Differential[[id number]]                            In process                   Please view results for these tests on the individual orders.    CBC Auto Differential [[id number]] Collected: 24    Specimen: Blood Updated: 24          Radiology Review:   Imaging Results (Last 24 Hours)       ** No results found for the last 24 hours. 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  Intrauterine pregnancy at 20w0d weeks gestation with reassuring fetal status.    2.  Incompetent cervix versus  labor  3.  No evidence of acute infection.  4.  History of  x 1 at 34 weeks 6 days  5.  History of D&E  11 and 18 weeks due to aneuploidy and failed spontaneous AB at 18 weeks gestation  6.  MTHFR heterozygous  7.  Obesity BMI 43.85  8.  AST slightly elevated at 49 most likely DUNNE  Plan:  1.  Admit to antepartum unit, continuous toco, Indocin tocolysis, antibiotics, n.p.o. after midnight, PDC ultrasound in a.m. possible cerclage  2. Plan of care has been reviewed with patient.  3.  Risks, benefits of treatment plan have been discussed.  4.  All questions have been answered.  5      Aung Rajan DO  3/25/2024  16:57 EDT    Electronically signed by Aung Rajan DO at 03/26/24 0804       Facility-Administered Medications as of 3/29/2024   Medication Dose Route Frequency Provider Last Rate Last Admin    [COMPLETED] ceFAZolin 1000 mg IVPB in 100 mL NS (MBP)  1,000 mg Intravenous Q8H Yaritza Echevarria MD   1,000 mg at 03/29/24 0332    [COMPLETED] clindamycin (CLEOCIN) 900 mg in dextrose 5% 50 mL IVPB (premix)  900 mg Intravenous Q8H Aung Rajan  mL/hr at 03/27/24 1041 900 mg at 03/27/24 1041    [COMPLETED] gentamicin (GARAMYCIN) IVPB 120 mg  120 mg Intravenous Q8H Aung Rajan DO   120 mg at 03/27/24 1110    [COMPLETED] indomethacin (INDOCIN) capsule 50 mg  50 mg Oral Once Aung Rajan DO   50 mg at 03/25/24 1615    Followed by    [COMPLETED] indomethacin (INDOCIN) capsule 25 mg  25 mg Oral Q4H Gill Pabon MD   25 mg at 03/29/24 0851    [COMPLETED] Sod Citrate-Citric Acid (BICITRA) oral solution 30 mL  30 mL Oral Once Yaritza Echevarria MD   30 mL at 03/27/24 0920     Lab Results (all)       Procedure Component Value Units Date/Time    T Pallidum Antibody w/ reflex RPR (Syphilis) [[id number]]  (Normal) Collected: 03/25/24 1638    Specimen: Blood Updated: 03/25/24 2327     Treponemal AB Total Non-Reactive    Preeclampsia Panel [[id number]]  (Abnormal) Collected: 03/25/24 1638    Specimen: Blood Updated: 03/25/24 1714     Alkaline Phosphatase 109 U/L      ALT (SGPT) 20  U/L      Comment: Specimen hemolyzed.  Result may  be falsely elevated.        AST (SGOT) 49 U/L      Comment: Specimen hemolyzed.  Result may be falsely elevated.        Creatinine 0.50 mg/dL      Total Bilirubin <0.2 mg/dL       U/L      Comment: Specimen hemolyzed.  Results may be affected.        Uric Acid 4.2 mg/dL     Urinalysis With Culture If Indicated - Urine, Clean Catch [[id number]]  (Normal) Collected: 03/25/24 1638    Specimen: Urine, Clean Catch Updated: 03/25/24 1709     Color, UA Yellow     Appearance, UA Clear     pH, UA 6.0     Specific Gravity, UA 1.007     Glucose, UA Negative     Ketones, UA Negative     Bilirubin, UA Negative     Blood, UA Negative     Protein, UA Negative     Leuk Esterase, UA Negative     Nitrite, UA Negative     Urobilinogen, UA 0.2 E.U./dL    Narrative:      In absence of clinical symptoms, the presence of pyuria, bacteria, and/or nitrites on the urinalysis result does not correlate with infection.  Urine microscopic not indicated.    CBC & Differential [[id number]]  (Abnormal) Collected: 03/25/24 1638    Specimen: Blood Updated: 03/25/24 1658    Narrative:      The following orders were created for panel order CBC & Differential.  Procedure                               Abnormality         Status                     ---------                               -----------         ------                     CBC Auto Differential[[id number]]        Abnormal            Final result                 Please view results for these tests on the individual orders.    CBC Auto Differential [[id number]]  (Abnormal) Collected: 03/25/24 1638    Specimen: Blood Updated: 03/25/24 1658     WBC 9.48 10*3/mm3      RBC 4.36 10*6/mm3      Hemoglobin 12.9 g/dL      Hematocrit 38.5 %      MCV 88.3 fL      MCH 29.6 pg      MCHC 33.5 g/dL      RDW 13.0 %      RDW-SD 42.4 fl      MPV 10.0 fL      Platelets 363 10*3/mm3      Neutrophil % 70.9 %      Lymphocyte % 19.2 %      Monocyte % 7.9 %       Eosinophil % 0.4 %      Basophil % 0.3 %      Immature Grans % 1.3 %      Neutrophils, Absolute 6.72 10*3/mm3      Lymphocytes, Absolute 1.82 10*3/mm3      Monocytes, Absolute 0.75 10*3/mm3      Eosinophils, Absolute 0.04 10*3/mm3      Basophils, Absolute 0.03 10*3/mm3      Immature Grans, Absolute 0.12 10*3/mm3      nRBC 0.0 /100 WBC           Imaging Results (All)       Procedure Component Value Units Date/Time    [facility] [[id number]] Collected: 24     Updated: 24    Narrative:      PAT NAME: JENNIFER ROBLES  MED REC#: [id number]  BIRTH DA: [id number]  PAT GEND: F  ACCOUNT#: [id number]  PAT TYPE: I  EXAM CHEN: [id number]  REF PHYS SOCORRO VICENTE  ACCESSION [id number]      Comparison Studies  =================    The findings of this study are compared to the prior ultrasound study dated 3/7/24      Patient Status  ============    Inpatient      Indication  ========    History of PROM at 18 weeks. History of 11 week loss with T15. MTHFR. Lovenox. History of PTD at 35 weeks. History of . [place] BMI 44. Short cervix.      Maternal Assessment  ==================    Height 170 cm  Height (ft) 5 ft  Height (in) 7 in  Weight 127 kg  Weight (lb) 280 lb  BMI 43.95 kg/m²      Method  =======    Transabdominal and transvaginal ultrasound examination. View: Limited by patient body habitus. Suboptimal view: limited by fetal position      Pregnancy  =========    Josue pregnancy. Number of fetuses: 1      Dating  ======    Method of dating: based on stated SHIRIN  GA by prior assessment 20 w + 1 d  SHIRIN by prior assessment: 2024  Ultrasound examination on: 3/26/2024  GA by U/S based upon: AC, BPD, Femur, HC  GA by U/S 21 w + 1 d  SHIRIN by U/S: 2024  Previous dating: based on stated SHIRIN, selected on 2024  Agreed SHIRIN of previous datin2024  Assigned: based on stated SHIRIN, selected on 2024  Assigned GA 20 w + 1 d  Assigned SHIRIN: 2024  Pregnancy  length 280 d      Fetal Biometry  ============    Standard  BPD 51.6 mm  21w 5d        95%        Hadlock    OFD 66.2 mm  22w 2d        98%        Syl    .1 mm  21w 1d        85%        Hadlock    Cerebellum tr 22.7 mm  21w 1d        95%        Hill    Nuchal fold 6.1 mm  .7 mm  20w 3d        56%        Hadlock    Femur 36.0 mm  21w 3d        83%        Hadlock    Humerus 32.7 mm  21w 0d        82%        Syl    HC / AC 1.24          92%        Hadlock     g    EFW (lb) 0 lb  EFW (oz) 14 oz  EFW by: Hadlock (BPD-HC-AC-FL)  Extended  Tibia 31.5 mm  21w 5d        92%        Syl    Fibula 34.0 mm  22w 4d        88%        Syl    Foot 38.2 mm          98%        Chitty    Radius 27.8 mm  20w 3d        57%        Syl    Ulna 30.8 mm  21w 4d        79%        Syl    Cav. septi pel. tr 4.3 mm   7.0 mm  CM 6.3 mm          85%        Nicolaides    Head / Face / Neck  Cephalic index 0.78          41%        Nicolaides    Extremities / Bony Struc  FL / BPD 0.70          44%        Hadlock    FL / HC 0.19          61%        Hadlock    FL / AC 0.24          91%        Hadlock    Other Structures   bpm      General Evaluation  ================    Cardiac activity present.  bpm.  Fetal movements present.  Presentation cephalic.  Placenta Placental site: posterior.  Umbilical cord Cord vessels: 3 vessel cord. Insertion site: placental insertion: normal.  Amniotic fluid Amount of AF: normal. MVP 4.2 cm.      Fetal Anatomy  ============    Cranium: Appears normal  Midline falx: Appears normal  Cavum septi pellucidi: Appears normal  Cerebellum: Appears normal  Cisterna magna: Appears normal  Head / Neck  Rt lateral ventricle: Appears normal  Lt lateral ventricle: Appears normal  Rt choroid plexus: Appears normal  Lt choroid plexus: Appears normal  Vermis: Appears normal  Neck: Appears normal  Nuchal fold: Appears normal  Lips: Appear normal  Profile: not examined  Nose: not  examined  Face  Palate: suboptimal  Orbits: Appears normal  Lens: Normal  4-chamber view: suboptimal  RVOT view: suboptimal  LVOT view: suboptimal  Heart / Thorax  Aortic arch view: not examined  Ductal arch view: not examined  SVC: not examined  IVC: not examined  3-vessel view: Appears normal  3-vessel-trachea view: Appears normal  Rt lung: Appears normal  Lt lung: normal  Diaphragm: Appears normal  Diaphragm: Intact  Cord insertion: Appears normal  Stomach: Appears normal  Bladder: Appears normal  Abdomen  Rt kidney: normal  Lt kidney: normal  Liver: normal  Small bowel: normal  Large bowel: normal  Cervical spine: Appears normal  Thoracic spine: Appears normal  Lumbar spine: Appears normal  Sacral spine: Appears normal  Arms: Appears normal  Legs: Appears normal  Rt upper arm: Appears normal  Rt forearm: Appears normal  Rt hand: Appears normal  Rt fingers: suboptimal  Lt upper arm: Appears normal  Lt forearm: Appears normal  Lt hand: Appears normal  Lt fingers: suboptimal  Rt upper leg: Appears normal  Rt lower leg: Appears normal  Rt foot: Appears normal  Lt upper leg: Appears normal  Lt lower leg: Appears normal  Lt foot: Appears normal  Gender: male  Wants to know gender: yes      Maternal Structures  ================    Uterus / Cervix  Cervix: Visualized  Approach: Transvaginal  Cervical length 4.2 mm  Funneling: Funneling present  Ovaries / Tubes / Adnexa  Rt ovary: Visualized  Rt ovary D1 24.8 mm  Rt ovary D2 19.5 mm  Rt ovary D3 18.7 mm  Rt ovary Vol 4.7 cm³  Lt ovary: Not visualized      Impression  =========    Size consistent with dates.    No fetal anomalies were identified.    No fetal markers for trisomy.    On transvaginal scan, ccervix funnels to the external os.      Recommendation  ===============    Continue in hospital management.      Coding  ======    Description: 81575-53 Detailed Ultrasound  Description: 28866-74 Transvaginal Ultrasound OB        Sonographer: RT ABELARDO Gonzalez ,  [facility]  Physician: Doug Milligan, MD, FACOG    Electronically signed by: Doug Milligan, MD, FACOG at: 2024/03/26 08:38          Orders (all)        Start     Ordered    03/29/24 1454  Discharge patient  Once         03/29/24 1502    03/29/24 0900  lactobacillus acidophilus (RISAQUAD) capsule 1 capsule  Daily,   Status:  Discontinued         03/29/24 0741    03/27/24 2000  gentamicin (GARAMYCIN) IVPB 120 mg  Every 8 Hours,   Status:  Discontinued        Note to Pharmacy: Separate Administration Time With Ampicillin and Other Penicillins (Ampicillin / Penicillins deactivate gentamicin when infused together).    03/27/24 1056    03/27/24 2000  clindamycin (CLEOCIN) 900 mg in dextrose 5% 50 mL IVPB (premix)  Every 8 Hours,   Status:  Discontinued         03/27/24 1056    03/27/24 1915  Diet: Regular/House; Fluid Consistency: Thin (IDDSI 0)  Diet Effective Now,   Status:  Canceled         03/27/24 1915    03/27/24 1056  oxyCODONE (ROXICODONE) immediate release tablet 5 mg  Every 4 Hours PRN,   Status:  Discontinued         03/27/24 1056    03/27/24 1000  Sod Citrate-Citric Acid (BICITRA) oral solution 30 mL  Once         03/27/24 0911    03/27/24 0001  NPO Diet NPO Type: Strict NPO  Diet Effective Midnight,   Status:  Canceled         03/26/24 1047    03/26/24 2100  prenatal vitamin tablet 1 tablet  Nightly,   Status:  Discontinued         03/26/24 1305    03/26/24 2100  citalopram (CeleXA) tablet 40 mg  Nightly,   Status:  Discontinued         03/26/24 1305    03/26/24 2100  vitamin D3 capsule 5,000 Units  Nightly,   Status:  Discontinued         03/26/24 1305    03/26/24 1400  prenatal vitamin tablet 1 tablet  Daily,   Status:  Discontinued         03/26/24 1300    03/26/24 1345  citalopram (CeleXA) tablet 40 mg  Daily,   Status:  Discontinued         03/26/24 1259    03/26/24 0954  Diet: Regular/House; Fluid Consistency: Thin (IDDSI 0)  Diet Effective Now,   Status:  Canceled         03/26/24 0954    03/26/24 0001  NPO  "Diet NPO Type: Strict NPO  Diet Effective Midnight,   Status:  Canceled         24 1647    24 2100  sodium chloride 0.9 % flush 10 mL  Every 12 Hours Scheduled,   Status:  Discontinued         24 1535    24 2030  indomethacin (INDOCIN) capsule 25 mg  Every 4 Hours        Placed in \"Followed by\" Linked Group    24 1535    24 2000  ceFAZolin 1000 mg IVPB in 100 mL NS (MBP)  Every 8 Hours         24 1852    24 1800  gentamicin (GARAMYCIN) IVPB 120 mg  Every 8 Hours        Note to Pharmacy: Separate Administration Time With Ampicillin and Other Penicillins (Ampicillin / Penicillins deactivate gentamicin when infused together).    24 1650    24 1800  clindamycin (CLEOCIN) 900 mg in dextrose 5% 50 mL IVPB (premix)  Every 8 Hours         24 1650    24 1730  lactated ringers infusion  Continuous,   Status:  Discontinued         24 1647    24 1718  Diet: Regular/House; Fluid Consistency: Thin (IDDSI 0)  Diet Effective Now,   Status:  Canceled        Comments: NPO after Midnight    24 1718    24 1654  ABO RH Specimen Verification  STAT         24 1653    24 1630  indomethacin (INDOCIN) capsule 50 mg  Once        Placed in \"Followed by\" Linked Group    24 1535    24 1600  Vital Signs q 4 while awake  Every 4 Hours,   Status:  Canceled      Comments: While the patient is awake.    24 1535    24 1537  [facility]  Diagnostic Center  1 Time Imaging         24 1537    24 1536  Monitor Fetal Heart Tones  Once,   Status:  Canceled         24 1535    24 1536  Continuous Tocometry  Until Discontinued,   Status:  Canceled         24 1535    24 1536  Bed Rest With Bathroom Privileges  Once,   Status:  Canceled         24 1535    24 1535  NPO Diet NPO Type: Strict NPO  Diet Effective Now,   Status:  Canceled         24 1535    24 1535  CBC & " Differential  STAT         03/25/24 1535    03/25/24 1535  Preeclampsia Panel  STAT         03/25/24 1535    03/25/24 1535  Urinalysis With Culture If Indicated - Urine, Clean Catch  Once         03/25/24 1535    03/25/24 1535  CBC Auto Differential  PROCEDURE ONCE         03/25/24 1535    03/25/24 1534  Admit To Obstetrics Inpatient  Once         03/25/24 1535    03/25/24 1534  Code Status and Medical Interventions:  Continuous,   Status:  Canceled         03/25/24 1535    03/25/24 1534  Place Sequential Compression Device  Once,   Status:  Canceled         03/25/24 1535    03/25/24 1534  Maintain Sequential Compression Device  Continuous,   Status:  Canceled         03/25/24 1535    03/25/24 1534  Vital Signs Per hospital policy  Per Hospital Policy,   Status:  Canceled         03/25/24 1535    03/25/24 1534  Intermittent Auscultation FOR LOW RISK PATIENTS - NST on Admission Along With Intermittent Auscultation of Fetal Heart Tones.  Per Order Details,   Status:  Canceled        Comments: Intermittent Auscultation FOR LOW RISK PATIENTS - NST on Admission Along With Intermittent Auscultation of Fetal Heart Tones and PRN    03/25/24 1535    03/25/24 1534  NST Low risk >24 weeks:  Monitor for 30 minutes BID (Antepartum)  Once,   Status:  Canceled        Comments: Fetal monitoring/NST:  Antepartum >24 weeks monitor fetus 30 minutes twice daily and PRN    03/25/24 1535    03/25/24 1534  Antepartum Patients  <24 Weeks - Document Fetal Heart Tones Daily and PRN.  Per Order Details,   Status:  Canceled        Comments: For Antepartum Patients Less Than 24 Weeks - Document Fetal Heart Tones Daily & PRN.    03/25/24 1535    03/25/24 1534  Notify Physician (specified)  Until Discontinued,   Status:  Canceled         03/25/24 1535    03/25/24 1534  Notify physician for hyperstimulus (per hospital algorithm)  Until Discontinued,   Status:  Canceled         03/25/24 1535    03/25/24 1534  Notify physician if membranes ruptured,  bleeding greater than 1 pad an hour, fetal heart tone abnormality, and severe pain  Until Discontinued,   Status:  Canceled         24 1535    24 1534  Initiate Group Beta Strep (GBS) Prophylaxis Protocol, If Criteria Met  Continuous,   Status:  Canceled        Comments: NO TREATMENT RECOMMENDED IF: 1)  Maternal GBS status known negative 2)  Scheduled  birth with intact membranes, not in labor.  3 ) Maternal GBS unknown, no risk factors.   TREAT WITH ANTIBIOTICS IF:  1)  Maternal GBS status is known postive.  2)  Maternal GBS status unknown with these risk factors:  a)  Previous infant affected by GBS infection.  b)  GBS urinary tract infection (UTI) or bacteruria during pregnancy  c)  Unexplained maternal fever in labor (greater than or equal to 100.4F or 38.0C)  d)  Prolonged rupture of the membranes greater than or equal to 18 hours.  e)  Gestational age less than 37 weeks.    24 1534  T Pallidum Antibody w/ reflex RPR (Syphilis)  Once         24 15324 1534  Type & Screen  Once         24 15324 1534  Insert Peripheral IV  Once,   Status:  Canceled         24 1535    24 1534  Saline Lock & Maintain IV Access  Continuous,   Status:  Canceled         24 1533  bisacodyl (DULCOLAX) suppository 10 mg  Daily PRN,   Status:  Discontinued         24 1533  Position Change - For Intra-Uterine Resusitation for Hypertonus, HyperStimulation or Non-Reassuring Fetal Status  As Needed,   Status:  Canceled       24 1535    24 1533  sodium chloride 0.9 % flush 10 mL  As Needed,   Status:  Discontinued         24 15324 1533  sodium chloride 0.9 % infusion 40 mL  As Needed,   Status:  Discontinued         24 1533  lidocaine PF 1% (XYLOCAINE) injection 0.5 mL  Once As Needed,   Status:  Discontinued         24 15324 1533  lactated  "ringers bolus 1,000 mL  As Needed,   Status:  Discontinued         24 1535    24 1533  acetaminophen (TYLENOL) tablet 650 mg  Every 4 Hours PRN,   Status:  Discontinued         24 1535    24 1533  ondansetron ODT (ZOFRAN-ODT) disintegrating tablet 8 mg  Every 8 Hours PRN,   Status:  Discontinued        Placed in \"Or\" Linked Group    24 1535    24 1533  ondansetron (ZOFRAN) injection 4 mg  Every 8 Hours PRN,   Status:  Discontinued        Placed in \"Or\" Linked Group    24 1535    24 1533  docusate sodium (COLACE) capsule 100 mg  2 Times Daily PRN,   Status:  Discontinued         24 153                     Operative/Procedure Notes (all)        Yaritza Echevarria MD at 24 0945  Version 1 of 1           Operative Note    Patient name: Ninfa Jackson  YOB: 1990   MRN: [id number]  Admission Date: 3/25/2024  Referring Provider: Nelida Olmstead MD    ID: 33 y.o.  at 20w2d    Preoperative Diagnosis:     Cervical insufficiency     Postoperative Diagnosis:     Same     Indication for Procedure:     Prior 18 week PPROM   Cervical shortening   Failed vaginal progesterone     Procedure(s):     Ontiveros Cerclage     Surgeons: Surgeons and Role:     * Yaritza Echevarria MD - Primary    Anesthesia: Spinal    Quantified Blood Loss: minimal mL    Urine Output: 125 mL     Preoperative antibiotic: Ancef, Gent, Clinda               Drains: Gardner catheter to gravity    Complications: None    Condition: Stable to recovery room    Findings:   Cervix approximately 3cms dilated with membranes flush with the external os. The anterior lip of the cervix approximately 4cms in length, the posterior was < 1 cm. There appears to be a defect in this area.    Cerclage placed with 5mm Mersilene tape and knot tied at 12:00     Operative Summary:   After obtaining informed consent the patient was taken to the operating room where adequate anesthesia was " obtained.   She was then placed in dorsal lithotomy position in candy cane stirrups. She was prepped and draped in a normal sterile fashion. A surgical time out was performed.    A weighted speculum was then placed. The cervix was grasped gently anteriorly and posteriorly with ring forceps. An anterior and sidewall right angle retractor were placed. The cerclage was placed using 5mm Mirsilene tape moving counterclockwise around the cervix. Four throws were used. The knot was tied at 12:00.    All instruments were removed from the vagina. Surgical field noted to be hemostatic. Bladder was drained by red rubber catheter for 150 ml clear urine.    Patient taken to recovery room in stable condition.     Electronically signed by Yaritza Echevarria MD at 24 1143          Physician Progress Notes (all)        Yaritza Echevarria MD at 24 1347              Maternal/Fetal Medicine Antepartum Progress Note     Name: Ninfa Jackson    : 1990     MRN: [id number]     Referring Provider: Nelida Olmstead MD    HD#:4    Hospital Course:   Ninfa Jackson is a 33 y.o.  20w3d POD1 s/p exam indicated cerclage in the setting of cervical insufficiency   Patient sleeping - did not wake. Will re-visit later   No overnight events   Continues on antibiotics and indocin     SHIRIN: Estimated Date of Delivery: 24     ROS:   As noted in HPI.     Objective     Vital Signs  Temp:  [98 °F (36.7 °C)-98.6 °F (37 °C)] 98.2 °F (36.8 °C)   Temp src: Oral   BP: (123-132)/(66-82) 123/66   Heart Rate:  [82-91] 86   Resp:  [14-20] 20           Sleeping.         CBC w/ diff:   Lab Results   Component Value Date    WBC 9.48 2024    NEUTRORELPCT 70.9 2024    AUTOIGPER 1.3 (H) 2024    LYMPHORELPCT 19.2 (L) 2024    MONORELPCT 7.9 2024    EOSRELPCT 0.4 2024    BASORELPCT 0.3 2024    HGB 12.9 2024    HCT 38.5 2024    MCV 88.3 2024    RDW 13.0 2024      2024       Imaging:  No new imaging       Medications (current):       ceFAZolin, 1,000 mg, Intravenous, Q8H  citalopram, 40 mg, Oral, Nightly  clindamycin, 900 mg, Intravenous, Q8H  gentamicin, 120 mg, Intravenous, Q8H  indomethacin, 25 mg, Oral, Q4H  prenatal vitamin, 1 tablet, Oral, Nightly  sodium chloride, 10 mL, Intravenous, Q12H  cholecalciferol, 5,000 Units, Oral, Nightly         acetaminophen    bisacodyl    docusate sodium    lactated ringers    lidocaine PF 1%    ondansetron ODT **OR** ondansetron    oxyCODONE    sodium chloride    sodium chloride      Assessment and Plan     Ninfa Jackson is a 33 y.o.  20w3d POD1 s/p exam indicated cerclage   Stable per chart review   Patient asleep - will re-visit later in day   Plan for now to d/c toco, continue abx and indocin x 48 hours post surgery (tomorrow AM)   Okay for out of bed to bathroom, ambulate         I spent 20 minutes caring for the patient on the day of service. This included: obtaining or reviewing a separately obtained medical history, reviewing patient records, performing a medically appropriate exam and/or evaluation, counseling or educating the patient/family/caregiver, ordering medications, labs, and/or procedures and documenting such in the medical record. This does not include time spent on review and interpretation of other tests such as fetal ultrasound or the performance of other procedures such as amniocentesis or CVS.    Yaritza Echevarria MD FACOG  Maternal Fetal Medicine, [facility]     2024                                          Electronically signed by Yaritza Echevarria MD at 24 1354       Yaritza Echevarria MD at 24 1129              Maternal/Fetal Medicine Antepartum Progress Note     Name: Ninfa Jackson    : 1990     MRN: [id number]     Referring Provider: Nelida Olmstead MD    HD#: 2    Hospital Course:   Ninfa Jackson is a 33  "y.o.  20w2d admitted with cervical shortening/dilation   Overall well. No overnight events   No LOF, VB, CTX     SHIRIN: Estimated Date of Delivery: 24     ROS:   As noted in HPI.     Objective     Vital Signs  Temp:  [97.7 °F (36.5 °C)-98.9 °F (37.2 °C)] 98.8 °F (37.1 °C)   Temp src: Oral   BP: ()/(54-83) 93/58   Heart Rate:  [] 67   Resp:  [16-18] 16   SpO2:  [98 %-100 %] 100 %                   Flowsheet Rows      Flowsheet Row First Filed Value   Admission Height 170.2 cm (67\") Documented at 2024 1535   Admission Weight 127 kg (280 lb) Documented at 2024 1535            Intake/Output last 24 hours:      Intake/Output Summary (Last 24 hours) at 3/27/2024 1130  Last data filed at 3/27/2024 1110  Gross per 24 hour   Intake 950 ml   Output 150 ml   Net 800 ml       Intake/Output this shift:    I/O this shift:  In: 950 [I.V.:800; IV Piggyback:150]  Out: 150 [Urine:150]      Exam     NAD   Abd soft, NT         Medications (current):       ceFAZolin, 1,000 mg, Intravenous, Q8H  citalopram, 40 mg, Oral, Nightly  clindamycin, 900 mg, Intravenous, Q8H  gentamicin, 120 mg, Intravenous, Q8H  indomethacin, 25 mg, Oral, Q4H  prenatal vitamin, 1 tablet, Oral, Nightly  sodium chloride, 10 mL, Intravenous, Q12H  cholecalciferol, 5,000 Units, Oral, Nightly         acetaminophen    bisacodyl    docusate sodium    lactated ringers    lidocaine PF 1%    ondansetron ODT **OR** ondansetron    oxyCODONE    sodium chloride    sodium chloride      Assessment and Plan     Ninfa Jackson is a 33 y.o.  20w2d with cervical insufficiency   No evidence of abruption, chorioamnionitis or labor   Reviewed plan for exam indicated cerclage this AM   Questions solicited and answered         I spent 15 minutes caring for the patient on the day of service. This included: obtaining or reviewing a separately obtained medical history, reviewing patient records, performing a medically appropriate exam and/or " evaluation, counseling or educating the patient/family/caregiver, ordering medications, labs, and/or procedures and documenting such in the medical record. This does not include time spent on review and interpretation of other tests such as fetal ultrasound or the performance of other procedures such as amniocentesis or CVS.    Yaritza Echevarria MD FACOG  Maternal Fetal Medicine, [facility]     2024                                          Electronically signed by Yaritza Echevarria MD at 24 1133          Discharge Summary        Yaritza Echevarria MD at 24 0939          Discharge Summary    Patient Name: Ninfa Jackson  : 1990  MRN: [id number]  Date of Service: 3/29/2024  Referring Provider: Nelida Olmstead  Discharge Provider: Yaritza Echevarria MD    Date of Admission: 3/25/2024    Date of Discharge:  3/29/2024         Admission Diagnosis: Incompetent cervix during second trimester, antepartum [O34.32]     Discharge Diagnosis: Same     Procedures: Ontiveros Cerclage     Hospital Course:    Patient admitted on 3/25/24 with suspected cervical insufficiency. Found to have closed cervical length of 2mm. She was started on cefazolin, gentamicin, clindamycin and indocin. She was observed for 24 hours for evidence of labor, chorioamnionitis, abruption. She underwent Ontiveros Cerclage on 3/27/24. She was maintained on triple antibiotics and indocin until 3/29/24. She was ambulating, voiding, tolerating PO and controlled pain. No VB, fever or LOF. She was deemed stable for discharge.     Discharge Condition: Stable    Discharge to: Home    Discharge Medications:      Discharge Medications        ASK your doctor about these medications        Instructions Start Date   citalopram 40 MG tablet  Commonly known as: CeleXA   40 mg, Oral, Daily      Enoxaparin Sodium 30 MG/0.3ML solution prefilled syringe syringe  Commonly known as: LOVENOX    30 mg, Subcutaneous, Daily      ondansetron 4 MG/5ML solution  Commonly known as: ZOFRAN   4 mg, Oral, Once As Needed      prenatal vitamin 27-0.8 27-0.8 MG tablet tablet   Oral, Daily      Progesterone 200 MG capsule  Commonly known as: PROMETRIUM   200 mg, Oral, Daily, Mail order waiting to come in to start       vitamin D3 125 MCG (5000 UT) capsule capsule   5,000 Units, Oral, 2 Times Daily               Discharge Diet: Regular diet     Discharge Activity:   No heavy lifting, limited activity, pelvic rest     Follow up appointments:   1 week with Dr Olmstead   2 weeks with NIRMAL Echevarria MD  3/29/2024 09:40 EDT    Electronically signed by Yaritza Echevarria MD at 03/29/24 1844

## 2024-04-15 ENCOUNTER — HOSPITAL ENCOUNTER (INPATIENT)
Facility: HOSPITAL | Age: 34
LOS: 3 days | Discharge: OTHER INSTITUTION | DRG: 819 | End: 2024-04-18
Attending: OBSTETRICS & GYNECOLOGY | Admitting: OBSTETRICS & GYNECOLOGY
Payer: COMMERCIAL

## 2024-04-15 ENCOUNTER — OFFICE VISIT (OUTPATIENT)
Dept: OBSTETRICS AND GYNECOLOGY | Facility: HOSPITAL | Age: 34
End: 2024-04-15
Payer: COMMERCIAL

## 2024-04-15 ENCOUNTER — HOSPITAL ENCOUNTER (OUTPATIENT)
Dept: WOMENS IMAGING | Facility: HOSPITAL | Age: 34
Discharge: HOME | End: 2024-04-15
Admitting: OBSTETRICS & GYNECOLOGY
Payer: COMMERCIAL

## 2024-04-15 VITALS — DIASTOLIC BLOOD PRESSURE: 87 MMHG | WEIGHT: 276 LBS | BODY MASS INDEX: 43.23 KG/M2 | SYSTOLIC BLOOD PRESSURE: 137 MMHG

## 2024-04-15 DIAGNOSIS — O09.219 PREVIOUS PRETERM DELIVERY, ANTEPARTUM: Primary | ICD-10-CM

## 2024-04-15 DIAGNOSIS — N96 RECURRENT PREGNANCY LOSS: ICD-10-CM

## 2024-04-15 DIAGNOSIS — O44.40 LOW-LYING PLACENTA: ICD-10-CM

## 2024-04-15 DIAGNOSIS — O34.32 INCOMPETENT CERVIX DURING SECOND TRIMESTER, ANTEPARTUM: ICD-10-CM

## 2024-04-15 DIAGNOSIS — O44.40 LOW-LYING PLACENTA: Primary | ICD-10-CM

## 2024-04-15 LAB
ABO GROUP BLD: NORMAL
ALP SERPL-CCNC: 103 U/L (ref 39–117)
ALT SERPL W P-5'-P-CCNC: 14 U/L (ref 1–33)
AST SERPL-CCNC: 18 U/L (ref 1–32)
BASOPHILS # BLD AUTO: 0.05 10*3/MM3 (ref 0–0.2)
BASOPHILS NFR BLD AUTO: 0.4 % (ref 0–1.5)
BILIRUB SERPL-MCNC: <0.2 MG/DL (ref 0–1.2)
BILIRUB UR QL STRIP: NEGATIVE
BLD GP AB SCN SERPL QL: NEGATIVE
CLARITY UR: CLEAR
COLOR UR: YELLOW
CREAT SERPL-MCNC: 0.43 MG/DL (ref 0.57–1)
DEPRECATED RDW RBC AUTO: 40.7 FL (ref 37–54)
EOSINOPHIL # BLD AUTO: 0.14 10*3/MM3 (ref 0–0.4)
EOSINOPHIL NFR BLD AUTO: 1.1 % (ref 0.3–6.2)
ERYTHROCYTE [DISTWIDTH] IN BLOOD BY AUTOMATED COUNT: 12.6 % (ref 12.3–15.4)
GLUCOSE UR STRIP-MCNC: NEGATIVE MG/DL
HCT VFR BLD AUTO: 39 % (ref 34–46.6)
HGB BLD-MCNC: 13.4 G/DL (ref 12–15.9)
HGB UR QL STRIP.AUTO: NEGATIVE
IMM GRANULOCYTES # BLD AUTO: 0.14 10*3/MM3 (ref 0–0.05)
IMM GRANULOCYTES NFR BLD AUTO: 1.1 % (ref 0–0.5)
KETONES UR QL STRIP: NEGATIVE
LDH SERPL-CCNC: 206 U/L (ref 135–214)
LEUKOCYTE ESTERASE UR QL STRIP.AUTO: NEGATIVE
LYMPHOCYTES # BLD AUTO: 2.72 10*3/MM3 (ref 0.7–3.1)
LYMPHOCYTES NFR BLD AUTO: 21.6 % (ref 19.6–45.3)
MCH RBC QN AUTO: 30.1 PG (ref 26.6–33)
MCHC RBC AUTO-ENTMCNC: 34.4 G/DL (ref 31.5–35.7)
MCV RBC AUTO: 87.6 FL (ref 79–97)
MONOCYTES # BLD AUTO: 0.99 10*3/MM3 (ref 0.1–0.9)
MONOCYTES NFR BLD AUTO: 7.9 % (ref 5–12)
NEUTROPHILS NFR BLD AUTO: 67.9 % (ref 42.7–76)
NEUTROPHILS NFR BLD AUTO: 8.55 10*3/MM3 (ref 1.7–7)
NITRITE UR QL STRIP: NEGATIVE
NRBC BLD AUTO-RTO: 0 /100 WBC (ref 0–0.2)
PH UR STRIP.AUTO: 6 [PH] (ref 5–8)
PLATELET # BLD AUTO: 398 10*3/MM3 (ref 140–450)
PMV BLD AUTO: 10.1 FL (ref 6–12)
PROT UR QL STRIP: ABNORMAL
RBC # BLD AUTO: 4.45 10*6/MM3 (ref 3.77–5.28)
RH BLD: POSITIVE
SP GR UR STRIP: 1.02 (ref 1–1.03)
T&S EXPIRATION DATE: NORMAL
URATE SERPL-MCNC: 3.7 MG/DL (ref 2.4–5.7)
UROBILINOGEN UR QL STRIP: ABNORMAL
WBC NRBC COR # BLD AUTO: 12.59 10*3/MM3 (ref 3.4–10.8)

## 2024-04-15 PROCEDURE — 86780 TREPONEMA PALLIDUM: CPT | Performed by: OBSTETRICS & GYNECOLOGY

## 2024-04-15 PROCEDURE — 76817 TRANSVAGINAL US OBSTETRIC: CPT

## 2024-04-15 PROCEDURE — 84450 TRANSFERASE (AST) (SGOT): CPT | Performed by: OBSTETRICS & GYNECOLOGY

## 2024-04-15 PROCEDURE — 81003 URINALYSIS AUTO W/O SCOPE: CPT | Performed by: OBSTETRICS & GYNECOLOGY

## 2024-04-15 PROCEDURE — 84460 ALANINE AMINO (ALT) (SGPT): CPT | Performed by: OBSTETRICS & GYNECOLOGY

## 2024-04-15 PROCEDURE — 25010000002 BETAMETHASONE ACET & SOD PHOS PER 4 MG: Performed by: OBSTETRICS & GYNECOLOGY

## 2024-04-15 PROCEDURE — 85025 COMPLETE CBC W/AUTO DIFF WBC: CPT | Performed by: OBSTETRICS & GYNECOLOGY

## 2024-04-15 PROCEDURE — 76817 TRANSVAGINAL US OBSTETRIC: CPT | Performed by: OBSTETRICS & GYNECOLOGY

## 2024-04-15 PROCEDURE — 83615 LACTATE (LD) (LDH) ENZYME: CPT | Performed by: OBSTETRICS & GYNECOLOGY

## 2024-04-15 PROCEDURE — 86901 BLOOD TYPING SEROLOGIC RH(D): CPT | Performed by: OBSTETRICS & GYNECOLOGY

## 2024-04-15 PROCEDURE — 82247 BILIRUBIN TOTAL: CPT | Performed by: OBSTETRICS & GYNECOLOGY

## 2024-04-15 PROCEDURE — 99215 OFFICE O/P EST HI 40 MIN: CPT | Performed by: OBSTETRICS & GYNECOLOGY

## 2024-04-15 PROCEDURE — 86900 BLOOD TYPING SEROLOGIC ABO: CPT | Performed by: OBSTETRICS & GYNECOLOGY

## 2024-04-15 PROCEDURE — 82565 ASSAY OF CREATININE: CPT | Performed by: OBSTETRICS & GYNECOLOGY

## 2024-04-15 PROCEDURE — 84550 ASSAY OF BLOOD/URIC ACID: CPT | Performed by: OBSTETRICS & GYNECOLOGY

## 2024-04-15 PROCEDURE — 76815 OB US LIMITED FETUS(S): CPT | Performed by: OBSTETRICS & GYNECOLOGY

## 2024-04-15 PROCEDURE — 76815 OB US LIMITED FETUS(S): CPT

## 2024-04-15 PROCEDURE — 25010000002 GENTAMICIN PER 80 MG

## 2024-04-15 PROCEDURE — 84075 ASSAY ALKALINE PHOSPHATASE: CPT | Performed by: OBSTETRICS & GYNECOLOGY

## 2024-04-15 PROCEDURE — 99417 PROLNG OP E/M EACH 15 MIN: CPT | Performed by: OBSTETRICS & GYNECOLOGY

## 2024-04-15 PROCEDURE — 36415 COLL VENOUS BLD VENIPUNCTURE: CPT | Performed by: OBSTETRICS & GYNECOLOGY

## 2024-04-15 PROCEDURE — 25010000002 AMPICILLIN PER 500 MG: Performed by: OBSTETRICS & GYNECOLOGY

## 2024-04-15 PROCEDURE — 86850 RBC ANTIBODY SCREEN: CPT | Performed by: OBSTETRICS & GYNECOLOGY

## 2024-04-15 RX ORDER — LANOLIN ALCOHOL/MO/W.PET/CERES
400 CREAM (GRAM) TOPICAL
COMMUNITY

## 2024-04-15 RX ORDER — ONDANSETRON 4 MG/1
8 TABLET, ORALLY DISINTEGRATING ORAL EVERY 8 HOURS PRN
Status: DISCONTINUED | OUTPATIENT
Start: 2024-04-15 | End: 2024-04-18 | Stop reason: HOSPADM

## 2024-04-15 RX ORDER — PRENATAL VIT/IRON FUM/FOLIC AC 27MG-0.8MG
1 TABLET ORAL DAILY
Status: DISCONTINUED | OUTPATIENT
Start: 2024-04-15 | End: 2024-04-18 | Stop reason: HOSPADM

## 2024-04-15 RX ORDER — ACETAMINOPHEN 325 MG/1
650 TABLET ORAL EVERY 4 HOURS PRN
Status: DISCONTINUED | OUTPATIENT
Start: 2024-04-15 | End: 2024-04-18 | Stop reason: HOSPADM

## 2024-04-15 RX ORDER — NIFEDIPINE 10 MG/1
10 CAPSULE ORAL 3 TIMES DAILY
COMMUNITY

## 2024-04-15 RX ORDER — SODIUM CHLORIDE 0.9 % (FLUSH) 0.9 %
10 SYRINGE (ML) INJECTION EVERY 12 HOURS SCHEDULED
Status: DISCONTINUED | OUTPATIENT
Start: 2024-04-15 | End: 2024-04-18 | Stop reason: HOSPADM

## 2024-04-15 RX ORDER — CALCIUM CARBONATE 500 MG/1
2 TABLET, CHEWABLE ORAL DAILY PRN
Status: DISCONTINUED | OUTPATIENT
Start: 2024-04-15 | End: 2024-04-18 | Stop reason: HOSPADM

## 2024-04-15 RX ORDER — PROGESTERONE 100 MG/1
200 CAPSULE ORAL DAILY
Status: DISCONTINUED | OUTPATIENT
Start: 2024-04-15 | End: 2024-04-18 | Stop reason: HOSPADM

## 2024-04-15 RX ORDER — CITALOPRAM 40 MG/1
40 TABLET ORAL DAILY
Status: DISCONTINUED | OUTPATIENT
Start: 2024-04-15 | End: 2024-04-16

## 2024-04-15 RX ORDER — BISACODYL 10 MG
10 SUPPOSITORY, RECTAL RECTAL DAILY PRN
Status: DISCONTINUED | OUTPATIENT
Start: 2024-04-15 | End: 2024-04-18 | Stop reason: HOSPADM

## 2024-04-15 RX ORDER — MAGNESIUM SULFATE HEPTAHYDRATE 40 MG/ML
2 INJECTION, SOLUTION INTRAVENOUS CONTINUOUS
Status: DISCONTINUED | OUTPATIENT
Start: 2024-04-15 | End: 2024-04-17 | Stop reason: SDUPTHER

## 2024-04-15 RX ORDER — ONDANSETRON 2 MG/ML
4 INJECTION INTRAMUSCULAR; INTRAVENOUS EVERY 8 HOURS PRN
Status: DISCONTINUED | OUTPATIENT
Start: 2024-04-15 | End: 2024-04-18 | Stop reason: HOSPADM

## 2024-04-15 RX ORDER — BETAMETHASONE SODIUM PHOSPHATE AND BETAMETHASONE ACETATE 3; 3 MG/ML; MG/ML
12 INJECTION, SUSPENSION INTRA-ARTICULAR; INTRALESIONAL; INTRAMUSCULAR; SOFT TISSUE EVERY 24 HOURS
Status: COMPLETED | OUTPATIENT
Start: 2024-04-15 | End: 2024-04-16

## 2024-04-15 RX ORDER — SODIUM CHLORIDE 9 MG/ML
40 INJECTION, SOLUTION INTRAVENOUS AS NEEDED
Status: DISCONTINUED | OUTPATIENT
Start: 2024-04-15 | End: 2024-04-18 | Stop reason: HOSPADM

## 2024-04-15 RX ORDER — DEXTROSE AND SODIUM CHLORIDE 5; .2 G/100ML; G/100ML
75 INJECTION, SOLUTION INTRAVENOUS CONTINUOUS
Status: DISCONTINUED | OUTPATIENT
Start: 2024-04-15 | End: 2024-04-18 | Stop reason: HOSPADM

## 2024-04-15 RX ORDER — SODIUM CHLORIDE 0.9 % (FLUSH) 0.9 %
10 SYRINGE (ML) INJECTION AS NEEDED
Status: DISCONTINUED | OUTPATIENT
Start: 2024-04-15 | End: 2024-04-18 | Stop reason: HOSPADM

## 2024-04-15 RX ORDER — DEXTROSE AND SODIUM CHLORIDE 5; .2 G/100ML; G/100ML
50 INJECTION, SOLUTION INTRAVENOUS CONTINUOUS
OUTPATIENT
Start: 2024-04-15

## 2024-04-15 RX ORDER — LIDOCAINE HYDROCHLORIDE 10 MG/ML
0.5 INJECTION, SOLUTION EPIDURAL; INFILTRATION; INTRACAUDAL; PERINEURAL ONCE AS NEEDED
Status: DISCONTINUED | OUTPATIENT
Start: 2024-04-15 | End: 2024-04-18 | Stop reason: HOSPADM

## 2024-04-15 RX ORDER — CLINDAMYCIN PHOSPHATE 900 MG/50ML
900 INJECTION, SOLUTION INTRAVENOUS EVERY 8 HOURS
OUTPATIENT
Start: 2024-04-15 | End: 2024-04-18

## 2024-04-15 RX ORDER — DOCUSATE SODIUM 100 MG/1
100 CAPSULE, LIQUID FILLED ORAL 2 TIMES DAILY PRN
Status: DISCONTINUED | OUTPATIENT
Start: 2024-04-15 | End: 2024-04-18 | Stop reason: HOSPADM

## 2024-04-15 RX ADMIN — BETAMETHASONE SODIUM PHOSPHATE AND BETAMETHASONE ACETATE 12 MG: 3; 3 INJECTION, SUSPENSION INTRA-ARTICULAR; INTRALESIONAL; INTRAMUSCULAR at 17:35

## 2024-04-15 RX ADMIN — GENTAMICIN SULFATE 440 MG: 40 INJECTION, SOLUTION INTRAMUSCULAR; INTRAVENOUS at 20:33

## 2024-04-15 RX ADMIN — CITALOPRAM HYDROBROMIDE 40 MG: 40 TABLET ORAL at 18:44

## 2024-04-15 RX ADMIN — DEXTROSE AND SODIUM CHLORIDE 75 ML/HR: 5; 200 INJECTION, SOLUTION INTRAVENOUS at 18:39

## 2024-04-15 RX ADMIN — AMPICILLIN SODIUM 2 G: 2 INJECTION, POWDER, FOR SOLUTION INTRAMUSCULAR; INTRAVENOUS at 18:41

## 2024-04-15 RX ADMIN — PROGESTERONE 200 MG: 100 CAPSULE ORAL at 21:34

## 2024-04-15 RX ADMIN — PRENATAL VITAMINS-IRON FUMARATE 27 MG IRON-FOLIC ACID 0.8 MG TABLET 1 TABLET: at 18:44

## 2024-04-15 NOTE — H&P
Admit H&P    Patient Name: Ninfa Jackson  : 1990  MRN: [id number]  Date of Service: 4/15/2024  Referring Provider: Nelly Olmstead MD    ID: 33 y.o.  at 23w0d    Chief complaint: post-operative check with failed cerclage    HPI:  Pt is a pharmacist with a complicated obstetrical history most recently received a ultrasound indicated cerclage on 3/27/24 and presented to [facility] clinic for her post-op check today with no symptoms but found to have on TVUS an anterior cervix with SSE confirming visually loose cerclage stitch on the anterior lip of a visually 2 cm cervix with BBOW not in the vagina but seen in the cervix.  Pt is a 34 yo  at 23weeks 0days today.      In the past two weeks, pt had one episode right after her discharge from [facility] with abdominal cramping 2 days after the cerclage placement with work up with Dr. Olmstead involving a TVUS called normal at the time and placement empirically on nifedipine 10 mg TID.  Otherwise patient denies fever, chills, abdominal pain, n/v/d.  No vaginal bleeding No LOF  Pregnancy course significant for:          Her prenatal care is complicated by cerclage on 3/27/24 with noted posterior cervical defect at that time.   Her previous obstetric/gynecological history is noted for  G1 SAB .8 weeks  G2 PPTD 34w6d FTP PCS  G3 SAB 7 weeks   G4 18 week loss due to incompetent cervix with D & E performed for removal of fetal head with subsequent PTSD symptoms  G5 11 week MAB for T15 with D&E  G6 current     The following portions of the patients history were reviewed and updated as appropriate: current medications, allergies, past medical history, past surgical history, past family history, past social history, and problem list.       REVIEW OF SYSTEMS    Patient reports good fetal movement.  She denies any vaginal bleeding, leakage of fluid, or contractions.  She denies headache, visual changes, or right upper quadrant pain.  All other systems were reviewed  and were negative.    Prenatal Labs  Lab Results   Component Value Date    HGB 13.4 04/15/2024    HEPBSAG NEGATIVE 2020    ABORH O POS 2020    ABO O 2024    RH Positive 2024    ABSCRN Negative 2024       OB HISTORY    OB History          6    Para   2    Term   0       2    AB   3    Living   1         SAB   3    IAB   0    Ectopic   0    Molar   0    Multiple   0    Live Births   1          Obstetric Comments   FOB #1 Pregnancy #1 SAB at 8 weeks  FOB #1 Pregnancy #2 P C/S at 34 6/7 weeks, FTP, male  FOB #1 Pregnancy #3 SAB at 7 weeks,  FOB #1 Pregnancy #4 PPROM at 18 weeks, unable to deliver all of fetus, decapitated D&E done  FOB #1 Pregnancy #5 D&E at 11 week s, Trisomy 15 after demise  FOB #1 Pregnancy #6  current               PAST MEDICAL HISTORY    Past Medical History:   Diagnosis Date    Anxiety     Depression     Hyperemesis gravidarum      PAST SURGICAL HISTORY     has a past surgical history that includes d & c with suction (); D&E second trimester ();  section, low transverse (); [place] tooth extraction (); Deep neck lymph node biopsy / excision (); and Cervical cerclage (N/A, 3/27/2024).  CURRENT MEDICATIONS    No current facility-administered medications on file prior to encounter.     Current Outpatient Medications on File Prior to Encounter   Medication Sig Dispense Refill    citalopram (CeleXA) 40 MG tablet Take 1 tablet by mouth Daily.      Magnesium Oxide -Mg Supplement 400 (240 Mg) MG tablet Take 1 tablet by mouth 5 (Five) Times a Day.      NIFEdipine (PROCARDIA) 10 MG capsule Take 1 capsule by mouth 3 (Three) Times a Day.      ondansetron (ZOFRAN) 4 MG/5ML solution Take 5 mL by mouth 1 (One) Time As Needed for Nausea or Vomiting.      Prenatal Vit-Fe Fumarate-FA (prenatal vitamin 27-0.8) 27-0.8 MG tablet tablet Take  by mouth Daily.      Progesterone (PROMETRIUM) 200 MG capsule Take 1 capsule by mouth Daily. Mail order  waiting to come in to start      vitamin D3 125 MCG (5000 UT) capsule capsule Take 1 capsule by mouth 2 (Two) Times a Day.       ALLERGIES    Penicillins pt states that she can tolerate ampicillin  SOCIAL HISTORY    Social History     Tobacco Use   Smoking Status Never   Smokeless Tobacco Never     Social History     Substance and Sexual Activity   Alcohol Use Not Currently     Social History     Substance and Sexual Activity   Drug Use Never     FAMILY HISTORY  The patient has a family history of    PHYSICAL EXAMINATION    Vital Signs Range for the last 24 hours  Temperature:     Temp Source:     BP: BP: (137)/(87) 137/87   Pulse:     Respirations:     Weight: 125 kg (276 lb)     Constitutional:  Well developed, well nourished, no acute distress, well-groomed.  HEENT: Grossly normal.  Respiratory:  Unlabored, normal breath sounds.   Cardiovascular:  Normal rate and rhythm  Abdomen:  Soft, gravid, nontender.  Uterus: Soft, nontender. Fundus appropriate for dates.  Neurologic:  Alert & oriented x 4,  no focal deficits noted.   Psychiatric:  Speech and behavior appropriate.   Extremities: no cyanosis, clubbing or edema, no evidence of DVT.      External Fetal Monitoring:    Fetal Heart Rate Assessment   Method:     Beats/min:     Baseline:     Varibility:     Accels:     Decels:     Tracing Category:       Uterine Assessment   Method: Method: per patient report, palpation   Frequency (min):     Ctx Count in 10 min:     Duration:     Intensity: Contraction Intensity: no contractions   Intensity by IUPC:     Resting Tone: Uterine Resting Tone: soft by palpation   Resting Tone by IUPC:     Lala Units:       Cervix: Exam by:     Dilation:     Effacement:     Station:       Ultrasound:    betamethasone acetate-betamethasone sodium phosphate, 12 mg, Intramuscular, Q24H  citalopram, 40 mg, Oral, Daily  magnesium sulfate, 6 g, Intravenous, Once  prenatal vitamin 27-0.8, 1 tablet, Oral, Daily  Progesterone, 200 mg, Oral,  Daily  sodium chloride, 10 mL, Intravenous, Q12H        Labs:      Lab Results (last 24 hours)       Procedure Component Value Units Date/Time    CBC & Differential [[id number]]  (Abnormal) Collected: 04/15/24 1705    Specimen: Blood Updated: 04/15/24 1715    Narrative:      The following orders were created for panel order CBC & Differential.  Procedure                               Abnormality         Status                     ---------                               -----------         ------                     CBC Auto Differential[[id number]]        Abnormal            Final result                 Please view results for these tests on the individual orders.    CBC Auto Differential [[id number]]  (Abnormal) Collected: 04/15/24 1705    Specimen: Blood Updated: 04/15/24 1715     WBC 12.59 10*3/mm3      RBC 4.45 10*6/mm3      Hemoglobin 13.4 g/dL      Hematocrit 39.0 %      MCV 87.6 fL      MCH 30.1 pg      MCHC 34.4 g/dL      RDW 12.6 %      RDW-SD 40.7 fl      MPV 10.1 fL      Platelets 398 10*3/mm3      Neutrophil % 67.9 %      Lymphocyte % 21.6 %      Monocyte % 7.9 %      Eosinophil % 1.1 %      Basophil % 0.4 %      Immature Grans % 1.1 %      Neutrophils, Absolute 8.55 10*3/mm3      Lymphocytes, Absolute 2.72 10*3/mm3      Monocytes, Absolute 0.99 10*3/mm3      Eosinophils, Absolute 0.14 10*3/mm3      Basophils, Absolute 0.05 10*3/mm3      Immature Grans, Absolute 0.14 10*3/mm3      nRBC 0.0 /100 WBC             ASSESSMENT/PLAN:  33 y.o. female  at 23w0d with Incompetent cervix during second trimester, antepartum.    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    Gill Pabon MD  4/15/2024  17:19 EDT

## 2024-04-15 NOTE — PROGRESS NOTES
Pharmacokinetic Consult - Gentamicin Dosing  Ninfa Jackson is a 33 y.o. female who has been consulted to dose gentamicin for complicated skin and soft tissue infection.    Current Antimicrobial Therapy    Anti-Infectives (From admission, onward)      Ordered     Dose/Rate Route Frequency Start Stop    04/15/24 1745  gentamicin (GARAMYCIN) 440 mg in sodium chloride 0.9 % IVPB        Note to Pharmacy: Separate Administration Time With Ampicillin and Other Penicillins (Ampicillin / Penicillins deactivate gentamicin when infused together).   Ordering Provider: Adelaida Nye RPH    5 mg/kg × 87 kg (Adjusted)  over 30 Minutes Intravenous Every 24 Hours 04/15/24 1900 04/20/24 1859    04/15/24 1720  ampicillin 2000 mg IVPB in 100 mL NS (MBP)        Ordering Provider: Gill Pabon MD    2 g  200 mL/hr over 30 Minutes Intravenous Every 6 Hours 04/15/24 1815 04/18/24 1814    04/15/24 1643  Pharmacy to Dose gentamicin (GARAMYCIN)        Ordering Provider: Gill Pabon MD     Does not apply Continuous PRN 04/15/24 1642 04/18/24 1641            Allergies  Penicillins    Relevant clinical data and objective history reviewed:  Creatinine   Date Value Ref Range Status   03/25/2024 0.50 (L) 0.57 - 1.00 mg/dL Final   07/28/2022 0.7 0.4 - 1.0 mg/dL Final     Estimated Creatinine Clearance: 219.8 mL/min (A) (by C-G formula based on SCr of 0.5 mg/dL (L)).  No intake/output data recorded.  Patient weight: 125 kg (276 lb)    Asessment/Plan  1. Will dose gentamicin 5 mg/kg every 24 hours  2. Pharmacy will monitor Ms. Jackson's renal function and clinical status and adjust the gentamicin dose and/or frequency as needed.    Thank you  Adelaida Nye RPH  4/15/2024  17:48 EDT

## 2024-04-15 NOTE — LETTER
"April 15, 2024       No Recipients    Patient: Ninfa Jackson   YOB: 1990   Date of Visit: 4/15/2024       Dear Nelida Olmstead MD,    Thank you for referring Ninfa Jackson to me for evaluation. Below is a copy of my consult note.    If you have questions, please do not hesitate to call me. I look forward to following Ninfa along with you.         Sincerely,        Yaritza Echevarria MD        CC:   No Recipients        Maternal/Fetal Medicine Follow Up Note     Name: Ninfa Jackson    : 1990     MRN: [id number]     Referring Provider: Nelida Olmstead MD    Chief Complaint  S/P cerclage    Subjective     History of Present Illness:  Ninfa Jackson is a 33 y.o.  23w0d who presents today for follow up in the setting of ultrasound indicated cerclage.   Patient initially placed on vaginal progesterone though had cervical shortening at approximately 20 weeks GA for which she underwent ultrasound indicated cerclage by me about 2.5 weeks ago. At that time noted to have a defect in the posterior cervix consistent with an unrepaired cervical laceration.   She reports that she did well post-op though was evaluated for lower abdominal pain a couple days after discharge. She was started on PO nifedipine (10 mg TID). She has had no further lower abdominal pain, LOF, VB, fever.     OB history notable for the followin week PCS (failure to progress), 18 week PPROM/delivery (traumatic vaginal delivery with fetal decapitation), 11 week missed Ab with D&C for Trisomy 15, early loss x 2     NIPS low risk      SHIRIN: Estimated Date of Delivery: 24     ROS:   As noted in HPI.     Objective     Vital Signs  /87   Wt 125 kg (276 lb)   LMP 10/28/2023 (Approximate)   Estimated body mass index is 43.23 kg/m² as calculated from the following:    Height as of 3/25/24: 170.2 cm (67\").    Weight as of this encounter: 125 kg (276 lb).    Ultrasound Impression:   See " viewpoint      Assessment and Plan     Ninfa Jackson is a 33 y.o.  23w0d     Diagnoses and all orders for this visit:    1. Previous  delivery, antepartum (Primary)    2. Incompetent cervix during second trimester, antepartum  Assessment & Plan:  Patient is s/p ultrasound indicated cerclage in the setting of a prior 35 week PCS and 18 week PPROM - now failed cerclage. Cerclage was placed uneventfully 2.5 weeks ago though was noted to have a defect in her posterior cervix that was possible explanation for her cervical insufficiency.   Post-operatively the patient has done well though she was evaluated for lower abdominal cramping a couple days post-operatively and was placed on Nifedipine 10mg TID by her primary OB. Since that time she has had no concerns. No pain, LOF, VB, fever.   On exam today the cerclage is noted to be displaced and in the anterior lip of the cervix only. On TV ultrasound the cervix was not able to be identified though the cerclage was noted posterior to the bladder. On speculum exam, the cervix appears 2 - 3 cms dilated with slightly bulging membranes and the cerclage is in the anterior lip of the cervix only. Due to patient discomfort, the cerclage was not removed.   I discussed with the patient the following: she is very high risk of PPROM/delivery though at this time there is no clinical evidence of labor, chorioamnionitis or abruption. Will plan for immediate evaluation on labor and delivery including initiation of tocometry, labs (CBC, UA), betamethasone for FLM, Mag Sulfate and Amp/Gent/Clinda. If there is evidence of labor, PPROM, chorio or imminent delivery we will transfer to  for NICU access. If patient remains stable over the course of the next 24 - 48 hours, would be willing to redo cerclage if the patient would like to proceed. We also discussed the possibility of expectant management though the patient is not interested in this at this time. She would like fetal  intervention if she delivers and would therefore prefer to transfer to  should delivery appear imminent. Plan of care reviewed with patient, charge nurse on labor and delivery and OB Hospitalist. Patient transferred to [facility] via wheelchair.       3. Recurrent pregnancy loss         Follow Up  TBD     I spent 60 minutes caring for the patient on the day of service. This included: obtaining or reviewing a separately obtained medical history, reviewing patient records, performing a medically appropriate exam and/or evaluation, counseling or educating the patient/family/caregiver, ordering medications, labs, and/or procedures and documenting such in the medical record. This does not include time spent on review and interpretation of other tests such as fetal ultrasound or the performance of other procedures such as amniocentesis or CVS.    Yaritza Echevarria MD FACOG  Maternal Fetal Medicine, [facility]    Diagnostic Center     04/15/2024

## 2024-04-15 NOTE — ASSESSMENT & PLAN NOTE
Patient is s/p ultrasound indicated cerclage in the setting of a prior 35 week PCS and 18 week PPROM - now failed cerclage. Cerclage was placed uneventfully 2.5 weeks ago though was noted to have a defect in her posterior cervix that was possible explanation for her cervical insufficiency.   Post-operatively the patient has done well though she was evaluated for lower abdominal cramping a couple days post-operatively and was placed on Nifedipine 10mg TID by her primary OB. Since that time she has had no concerns. No pain, LOF, VB, fever.   On exam today the cerclage is noted to be displaced and in the anterior lip of the cervix only. On TV ultrasound the cervix was not able to be identified though the cerclage was noted posterior to the bladder. On speculum exam, the cervix appears 2 - 3 cms dilated with slightly bulging membranes and the cerclage is in the anterior lip of the cervix only. Due to patient discomfort, the cerclage was not removed.   I discussed with the patient the following: she is very high risk of PPROM/delivery though at this time there is no clinical evidence of labor, chorioamnionitis or abruption. Will plan for immediate evaluation on labor and delivery including initiation of tocometry, labs (CBC, UA), betamethasone for FLM, Mag Sulfate and Amp/Gent/Clinda. If there is evidence of labor, PPROM, chorio or imminent delivery we will transfer to  for NICU access. If patient remains stable over the course of the next 24 - 48 hours, would be willing to redo cerclage if the patient would like to proceed. We also discussed the possibility of expectant management though the patient is not interested in this at this time. She would like fetal intervention if she delivers and would therefore prefer to transfer to  should delivery appear imminent. Plan of care reviewed with patient, charge nurse on labor and delivery and OB Hospitalist. Patient transferred to [facility] via wheelchair.

## 2024-04-15 NOTE — PROGRESS NOTES
"    Maternal/Fetal Medicine Follow Up Note     Name: Ninfa Jackson    : 1990     MRN: [id number]     Referring Provider: Nelida Olmstead MD    Chief Complaint  S/P cerclage    Subjective     History of Present Illness:  Ninfa Jackson is a 33 y.o.  23w0d who presents today for follow up in the setting of ultrasound indicated cerclage.   Patient initially placed on vaginal progesterone though had cervical shortening at approximately 20 weeks GA for which she underwent ultrasound indicated cerclage by me about 2.5 weeks ago. At that time noted to have a defect in the posterior cervix consistent with an unrepaired cervical laceration.   She reports that she did well post-op though was evaluated for lower abdominal pain a couple days after discharge. She was started on PO nifedipine (10 mg TID). She has had no further lower abdominal pain, LOF, VB, fever.     OB history notable for the followin week PCS (failure to progress), 18 week PPROM/delivery (traumatic vaginal delivery with fetal decapitation), 11 week missed Ab with D&C for Trisomy 15, early loss x 2     NIPS low risk      SHIRIN: Estimated Date of Delivery: 24     ROS:   As noted in HPI.     Objective     Vital Signs  /87   Wt 125 kg (276 lb)   LMP 10/28/2023 (Approximate)   Estimated body mass index is 43.23 kg/m² as calculated from the following:    Height as of 3/25/24: 170.2 cm (67\").    Weight as of this encounter: 125 kg (276 lb).    Ultrasound Impression:   See viewpoint      Assessment and Plan     Ninfa Jackson is a 33 y.o.  23w0d     Diagnoses and all orders for this visit:    1. Previous  delivery, antepartum (Primary)    2. Incompetent cervix during second trimester, antepartum  Assessment & Plan:  Patient is s/p ultrasound indicated cerclage in the setting of a prior 35 week PCS and 18 week PPROM - now failed cerclage. Cerclage was placed uneventfully 2.5 weeks ago though was noted to have a defect " in her posterior cervix that was possible explanation for her cervical insufficiency.   Post-operatively the patient has done well though she was evaluated for lower abdominal cramping a couple days post-operatively and was placed on Nifedipine 10mg TID by her primary OB. Since that time she has had no concerns. No pain, LOF, VB, fever.   On exam today the cerclage is noted to be displaced and in the anterior lip of the cervix only. On TV ultrasound the cervix was not able to be identified though the cerclage was noted posterior to the bladder. On speculum exam, the cervix appears 2 - 3 cms dilated with slightly bulging membranes and the cerclage is in the anterior lip of the cervix only. Due to patient discomfort, the cerclage was not removed.   I discussed with the patient the following: she is very high risk of PPROM/delivery though at this time there is no clinical evidence of labor, chorioamnionitis or abruption. Will plan for immediate evaluation on labor and delivery including initiation of tocometry, labs (CBC, UA), betamethasone for FLM, Mag Sulfate and Amp/Gent/Clinda. If there is evidence of labor, PPROM, chorio or imminent delivery we will transfer to  for NICU access. If patient remains stable over the course of the next 24 - 48 hours, would be willing to redo cerclage if the patient would like to proceed. We also discussed the possibility of expectant management though the patient is not interested in this at this time. She would like fetal intervention if she delivers and would therefore prefer to transfer to  should delivery appear imminent. Plan of care reviewed with patient, charge nurse on labor and delivery and OB Hospitalist. Patient transferred to [facility] via wheelchair.       3. Recurrent pregnancy loss         Follow Up  TBD     I spent 60 minutes caring for the patient on the day of service. This included: obtaining or reviewing a separately obtained medical history, reviewing patient  records, performing a medically appropriate exam and/or evaluation, counseling or educating the patient/family/caregiver, ordering medications, labs, and/or procedures and documenting such in the medical record. This does not include time spent on review and interpretation of other tests such as fetal ultrasound or the performance of other procedures such as amniocentesis or CVS.    Yaritza Echevarria MD FACOG  Maternal Fetal Medicine, [facility]     04/15/2024

## 2024-04-16 LAB — T PALLIDUM IGG SER QL: NORMAL

## 2024-04-16 PROCEDURE — 99233 SBSQ HOSP IP/OBS HIGH 50: CPT | Performed by: OBSTETRICS & GYNECOLOGY

## 2024-04-16 PROCEDURE — 25010000002 AMPICILLIN PER 500 MG: Performed by: OBSTETRICS & GYNECOLOGY

## 2024-04-16 PROCEDURE — 25010000002 CLINDAMYCIN 900 MG/50ML SOLUTION: Performed by: OBSTETRICS & GYNECOLOGY

## 2024-04-16 PROCEDURE — 25010000002 BETAMETHASONE ACET & SOD PHOS PER 4 MG: Performed by: OBSTETRICS & GYNECOLOGY

## 2024-04-16 PROCEDURE — 25010000002 MAGNESIUM SULFATE 20 GM/500ML SOLUTION: Performed by: OBSTETRICS & GYNECOLOGY

## 2024-04-16 PROCEDURE — 25010000002 GENTAMICIN PER 80 MG: Performed by: OBSTETRICS & GYNECOLOGY

## 2024-04-16 RX ORDER — CITALOPRAM 40 MG/1
40 TABLET ORAL DAILY
Status: DISCONTINUED | OUTPATIENT
Start: 2024-04-16 | End: 2024-04-16

## 2024-04-16 RX ORDER — CITALOPRAM 40 MG/1
40 TABLET ORAL NIGHTLY
Status: DISCONTINUED | OUTPATIENT
Start: 2024-04-16 | End: 2024-04-18 | Stop reason: HOSPADM

## 2024-04-16 RX ORDER — CLINDAMYCIN PHOSPHATE 900 MG/50ML
900 INJECTION, SOLUTION INTRAVENOUS EVERY 8 HOURS
Status: DISCONTINUED | OUTPATIENT
Start: 2024-04-16 | End: 2024-04-18

## 2024-04-16 RX ADMIN — CLINDAMYCIN PHOSPHATE 900 MG: 900 INJECTION, SOLUTION INTRAVENOUS at 11:30

## 2024-04-16 RX ADMIN — MAGNESIUM SULFATE HEPTAHYDRATE 2 G/HR: 40 INJECTION, SOLUTION INTRAVENOUS at 11:30

## 2024-04-16 RX ADMIN — PRENATAL VITAMINS-IRON FUMARATE 27 MG IRON-FOLIC ACID 0.8 MG TABLET 1 TABLET: at 09:36

## 2024-04-16 RX ADMIN — BETAMETHASONE SODIUM PHOSPHATE AND BETAMETHASONE ACETATE 12 MG: 3; 3 INJECTION, SUSPENSION INTRA-ARTICULAR; INTRALESIONAL; INTRAMUSCULAR at 17:50

## 2024-04-16 RX ADMIN — AMPICILLIN SODIUM 2 G: 2 INJECTION, POWDER, FOR SOLUTION INTRAMUSCULAR; INTRAVENOUS at 17:53

## 2024-04-16 RX ADMIN — AMPICILLIN SODIUM 2 G: 2 INJECTION, POWDER, FOR SOLUTION INTRAMUSCULAR; INTRAVENOUS at 14:30

## 2024-04-16 RX ADMIN — MAGNESIUM SULFATE HEPTAHYDRATE 2 G/HR: 40 INJECTION, SOLUTION INTRAVENOUS at 23:39

## 2024-04-16 RX ADMIN — AMPICILLIN SODIUM 2 G: 2 INJECTION, POWDER, FOR SOLUTION INTRAMUSCULAR; INTRAVENOUS at 06:30

## 2024-04-16 RX ADMIN — GENTAMICIN SULFATE 440 MG: 40 INJECTION, SOLUTION INTRAMUSCULAR; INTRAVENOUS at 20:01

## 2024-04-16 RX ADMIN — CLINDAMYCIN PHOSPHATE 900 MG: 900 INJECTION, SOLUTION INTRAVENOUS at 18:38

## 2024-04-16 RX ADMIN — MAGNESIUM SULFATE HEPTAHYDRATE 2 G/HR: 40 INJECTION, SOLUTION INTRAVENOUS at 00:40

## 2024-04-16 RX ADMIN — AMPICILLIN SODIUM 2 G: 2 INJECTION, POWDER, FOR SOLUTION INTRAMUSCULAR; INTRAVENOUS at 00:40

## 2024-04-16 NOTE — PROGRESS NOTES
"    Maternal/Fetal Medicine Antepartum Progress Note     Name: Ninfa Jackson    : 1990     MRN: [id number]     Referring Provider: Aung Rajan DO    HD#: [id number]      Ninfa Jackson is a 33 y.o.  23w1d admitted with cervical insufficiency with failed ultrasound indicated cerclage   Overnight no events   No LOF, VB, CTX. No fever   Received Mag Sulfate, antibiotics and betamethasone x 1       SHIRIN: Estimated Date of Delivery: 24     ROS:   As noted in HPI.     Objective     Vital Signs  Temp:  [97.6 °F (36.4 °C)-98.5 °F (36.9 °C)] 97.7 °F (36.5 °C)   Temp src: Oral   BP: ()/(56-97) 117/68   Heart Rate:  [] 96   Resp:  [16-18] 16   SpO2:  [91 %-99 %] 97 %       Device (Oxygen Therapy): room air   Weight:  [125 kg (276 lb)] 125 kg (276 lb)       Flowsheet Rows      Flowsheet Row First Filed Value   Admission Height 170.2 cm (67\") Documented at 04/15/2024 1645   Admission Weight 125 kg (276 lb) Documented at 04/15/2024 1645            Intake/Output last 24 hours:      Intake/Output Summary (Last 24 hours) at 2024 1038  Last data filed at 2024 0930  Gross per 24 hour   Intake 715.83 ml   Output 4200 ml   Net -3484.17 ml       Intake/Output this shift:    I/O this shift:  In: -   Out: 1300 [Urine:1300]      Exam      NAD   Abdomen soft, nontender   [place] flat     Labs:     CBC w/ diff:   Lab Results   Component Value Date    WBC 12.59 (H) 04/15/2024    NEUTRORELPCT 67.9 04/15/2024    AUTOIGPER 1.1 (H) 04/15/2024    LYMPHORELPCT 21.6 04/15/2024    MONORELPCT 7.9 04/15/2024    EOSRELPCT 1.1 04/15/2024    BASORELPCT 0.4 04/15/2024    HGB 13.4 04/15/2024    HCT 39.0 04/15/2024    MCV 87.6 04/15/2024    RDW 12.6 04/15/2024     04/15/2024       Imaging:     See Viewpoint     Medications (current):       ampicillin, 2 g, Intravenous, Q6H  betamethasone acetate-betamethasone sodium phosphate, 12 mg, Intramuscular, Q24H  citalopram, 40 mg, Oral, Nightly  gentamicin, 5 mg/kg " (Adjusted), Intravenous, Q24H  prenatal vitamin 27-0.8, 1 tablet, Oral, Daily  Progesterone, 200 mg, Oral, Daily  sodium chloride, 10 mL, Intravenous, Q12H         acetaminophen    bisacodyl    calcium carbonate    docusate sodium    lidocaine PF 1%    ondansetron ODT **OR** ondansetron    Pharmacy to Dose gentamicin (GARAMYCIN)    sodium chloride    sodium chloride      Assessment and Plan     Ninfa Jackson is a 33 y.o.  23w1d with cervical insufficiency (likely due to posterior cervical defect) with failed ultrasound indicated cerclage   No evidence of chorioamnionitis, labor, abruption   Plan for cerclage revision tomorrow   Continue Mag Sulfate   Continue course of betamethasone   Continue Amp and Gent. Add Clindamycin   To OR tomorrow 10:30AM. NPO after midnight         I spent 30 minutes caring for the patient on the day of service. This included: obtaining or reviewing a separately obtained medical history, reviewing patient records, performing a medically appropriate exam and/or evaluation, counseling or educating the patient/family/caregiver, ordering medications, labs, and/or procedures and documenting such in the medical record. This does not include time spent on review and interpretation of other tests such as fetal ultrasound or the performance of other procedures such as amniocentesis or CVS.    Yaritza Echevarria MD FACOG  Maternal Fetal Medicine, [facility]     2024

## 2024-04-17 ENCOUNTER — ANESTHESIA EVENT (OUTPATIENT)
Dept: LABOR AND DELIVERY | Facility: HOSPITAL | Age: 34
End: 2024-04-17
Payer: COMMERCIAL

## 2024-04-17 ENCOUNTER — ANESTHESIA (OUTPATIENT)
Dept: LABOR AND DELIVERY | Facility: HOSPITAL | Age: 34
End: 2024-04-17
Payer: COMMERCIAL

## 2024-04-17 PROCEDURE — 25010000002 AMPICILLIN PER 500 MG: Performed by: OBSTETRICS & GYNECOLOGY

## 2024-04-17 PROCEDURE — 59025 FETAL NON-STRESS TEST: CPT

## 2024-04-17 PROCEDURE — 0UVC7ZZ RESTRICTION OF CERVIX, VIA NATURAL OR ARTIFICIAL OPENING: ICD-10-PCS | Performed by: OBSTETRICS & GYNECOLOGY

## 2024-04-17 PROCEDURE — 59320 REVISION OF CERVIX: CPT | Performed by: OBSTETRICS & GYNECOLOGY

## 2024-04-17 PROCEDURE — 25010000002 CLINDAMYCIN 900 MG/50ML SOLUTION: Performed by: OBSTETRICS & GYNECOLOGY

## 2024-04-17 PROCEDURE — 25010000002 FENTANYL CITRATE (PF) 100 MCG/2ML SOLUTION: Performed by: NURSE ANESTHETIST, CERTIFIED REGISTERED

## 2024-04-17 PROCEDURE — 25010000002 MIDAZOLAM PER 1 MG: Performed by: NURSE ANESTHETIST, CERTIFIED REGISTERED

## 2024-04-17 PROCEDURE — 25010000002 BUPIVACAINE IN DEXTROSE 0.75-8.25 % SOLUTION: Performed by: NURSE ANESTHETIST, CERTIFIED REGISTERED

## 2024-04-17 PROCEDURE — 25810000003 LACTATED RINGERS SOLUTION: Performed by: OBSTETRICS & GYNECOLOGY

## 2024-04-17 PROCEDURE — 25010000002 ONDANSETRON PER 1 MG: Performed by: NURSE ANESTHETIST, CERTIFIED REGISTERED

## 2024-04-17 PROCEDURE — 25010000002 MAGNESIUM SULFATE 20 GM/500ML SOLUTION: Performed by: OBSTETRICS & GYNECOLOGY

## 2024-04-17 PROCEDURE — 25010000002 GENTAMICIN PER 80 MG: Performed by: OBSTETRICS & GYNECOLOGY

## 2024-04-17 RX ORDER — HYDROXYZINE HYDROCHLORIDE 25 MG/1
50 TABLET, FILM COATED ORAL 3 TIMES DAILY PRN
Status: DISCONTINUED | OUTPATIENT
Start: 2024-04-17 | End: 2024-04-18 | Stop reason: HOSPADM

## 2024-04-17 RX ORDER — BUPIVACAINE HYDROCHLORIDE 7.5 MG/ML
INJECTION, SOLUTION INTRASPINAL AS NEEDED
Status: DISCONTINUED | OUTPATIENT
Start: 2024-04-17 | End: 2024-04-17 | Stop reason: SURG

## 2024-04-17 RX ORDER — FAMOTIDINE 10 MG/ML
20 INJECTION, SOLUTION INTRAVENOUS 2 TIMES DAILY PRN
Status: DISCONTINUED | OUTPATIENT
Start: 2024-04-17 | End: 2024-04-18 | Stop reason: HOSPADM

## 2024-04-17 RX ORDER — LORAZEPAM 1 MG/1
1 TABLET ORAL EVERY 6 HOURS PRN
Status: DISCONTINUED | OUTPATIENT
Start: 2024-04-17 | End: 2024-04-18 | Stop reason: HOSPADM

## 2024-04-17 RX ORDER — MIDAZOLAM HYDROCHLORIDE 1 MG/ML
1 INJECTION INTRAMUSCULAR; INTRAVENOUS ONCE
Status: COMPLETED | OUTPATIENT
Start: 2024-04-17 | End: 2024-04-17

## 2024-04-17 RX ORDER — ONDANSETRON 2 MG/ML
INJECTION INTRAMUSCULAR; INTRAVENOUS AS NEEDED
Status: DISCONTINUED | OUTPATIENT
Start: 2024-04-17 | End: 2024-04-17 | Stop reason: SURG

## 2024-04-17 RX ORDER — SODIUM CHLORIDE, SODIUM LACTATE, POTASSIUM CHLORIDE, CALCIUM CHLORIDE 600; 310; 30; 20 MG/100ML; MG/100ML; MG/100ML; MG/100ML
125 INJECTION, SOLUTION INTRAVENOUS CONTINUOUS
Status: DISCONTINUED | OUTPATIENT
Start: 2024-04-17 | End: 2024-04-18 | Stop reason: HOSPADM

## 2024-04-17 RX ORDER — NALOXONE HCL 0.4 MG/ML
0.4 VIAL (ML) INJECTION
Status: CANCELLED | OUTPATIENT
Start: 2024-04-17 | End: 2024-04-18

## 2024-04-17 RX ORDER — INDOMETHACIN 25 MG/1
50 CAPSULE ORAL ONCE
Status: COMPLETED | OUTPATIENT
Start: 2024-04-17 | End: 2024-04-17

## 2024-04-17 RX ORDER — FENTANYL CITRATE 50 UG/ML
INJECTION, SOLUTION INTRAMUSCULAR; INTRAVENOUS AS NEEDED
Status: DISCONTINUED | OUTPATIENT
Start: 2024-04-17 | End: 2024-04-17 | Stop reason: SURG

## 2024-04-17 RX ORDER — MIDAZOLAM HYDROCHLORIDE 1 MG/ML
INJECTION INTRAMUSCULAR; INTRAVENOUS AS NEEDED
Status: DISCONTINUED | OUTPATIENT
Start: 2024-04-17 | End: 2024-04-17 | Stop reason: SURG

## 2024-04-17 RX ORDER — MAGNESIUM SULFATE HEPTAHYDRATE 40 MG/ML
2 INJECTION, SOLUTION INTRAVENOUS CONTINUOUS
Status: DISCONTINUED | OUTPATIENT
Start: 2024-04-17 | End: 2024-04-18 | Stop reason: HOSPADM

## 2024-04-17 RX ORDER — INDOMETHACIN 25 MG/1
25 CAPSULE ORAL EVERY 6 HOURS
Status: DISCONTINUED | OUTPATIENT
Start: 2024-04-17 | End: 2024-04-18 | Stop reason: HOSPADM

## 2024-04-17 RX ORDER — ZOLPIDEM TARTRATE 5 MG/1
5 TABLET ORAL ONCE
Status: COMPLETED | OUTPATIENT
Start: 2024-04-17 | End: 2024-04-17

## 2024-04-17 RX ADMIN — MAGNESIUM SULFATE HEPTAHYDRATE 2 G/HR: 40 INJECTION, SOLUTION INTRAVENOUS at 22:38

## 2024-04-17 RX ADMIN — CLINDAMYCIN PHOSPHATE 900 MG: 900 INJECTION, SOLUTION INTRAVENOUS at 04:30

## 2024-04-17 RX ADMIN — SODIUM CHLORIDE, POTASSIUM CHLORIDE, SODIUM LACTATE AND CALCIUM CHLORIDE 1000 ML: 600; 310; 30; 20 INJECTION, SOLUTION INTRAVENOUS at 10:47

## 2024-04-17 RX ADMIN — FAMOTIDINE 20 MG: 10 INJECTION, SOLUTION INTRAVENOUS at 22:38

## 2024-04-17 RX ADMIN — MIDAZOLAM HYDROCHLORIDE 1 MG: 1 INJECTION, SOLUTION INTRAMUSCULAR; INTRAVENOUS at 12:34

## 2024-04-17 RX ADMIN — Medication 10 ML: at 22:38

## 2024-04-17 RX ADMIN — PROGESTERONE 200 MG: 100 CAPSULE ORAL at 21:30

## 2024-04-17 RX ADMIN — AMPICILLIN SODIUM 2 G: 2 INJECTION, POWDER, FOR SOLUTION INTRAMUSCULAR; INTRAVENOUS at 06:37

## 2024-04-17 RX ADMIN — INDOMETHACIN 50 MG: 25 CAPSULE ORAL at 14:09

## 2024-04-17 RX ADMIN — SODIUM CHLORIDE, POTASSIUM CHLORIDE, SODIUM LACTATE AND CALCIUM CHLORIDE 1000 ML: 600; 310; 30; 20 INJECTION, SOLUTION INTRAVENOUS at 11:01

## 2024-04-17 RX ADMIN — MIDAZOLAM 1 MG: 1 INJECTION INTRAMUSCULAR; INTRAVENOUS at 11:00

## 2024-04-17 RX ADMIN — INDOMETHACIN 25 MG: 25 CAPSULE ORAL at 17:44

## 2024-04-17 RX ADMIN — ONDANSETRON 4 MG: 2 INJECTION INTRAMUSCULAR; INTRAVENOUS at 12:10

## 2024-04-17 RX ADMIN — MIDAZOLAM 1 MG: 1 INJECTION INTRAMUSCULAR; INTRAVENOUS at 11:08

## 2024-04-17 RX ADMIN — CLINDAMYCIN PHOSPHATE 900 MG: 900 INJECTION, SOLUTION INTRAVENOUS at 13:04

## 2024-04-17 RX ADMIN — CALCIUM CARBONATE (ANTACID) CHEW TAB 500 MG 2 TABLET: 500 CHEW TAB at 19:38

## 2024-04-17 RX ADMIN — PROGESTERONE 200 MG: 100 CAPSULE ORAL at 03:55

## 2024-04-17 RX ADMIN — PRENATAL VITAMINS-IRON FUMARATE 27 MG IRON-FOLIC ACID 0.8 MG TABLET 1 TABLET: at 14:11

## 2024-04-17 RX ADMIN — AMPICILLIN SODIUM 2 G: 2 INJECTION, POWDER, FOR SOLUTION INTRAMUSCULAR; INTRAVENOUS at 14:09

## 2024-04-17 RX ADMIN — CITALOPRAM HYDROBROMIDE 40 MG: 40 TABLET ORAL at 03:55

## 2024-04-17 RX ADMIN — CLINDAMYCIN PHOSPHATE 900 MG: 900 INJECTION, SOLUTION INTRAVENOUS at 20:07

## 2024-04-17 RX ADMIN — CITALOPRAM HYDROBROMIDE 40 MG: 40 TABLET ORAL at 21:30

## 2024-04-17 RX ADMIN — AMPICILLIN SODIUM 2 G: 2 INJECTION, POWDER, FOR SOLUTION INTRAMUSCULAR; INTRAVENOUS at 19:26

## 2024-04-17 RX ADMIN — AMPICILLIN SODIUM 2 G: 2 INJECTION, POWDER, FOR SOLUTION INTRAMUSCULAR; INTRAVENOUS at 00:24

## 2024-04-17 RX ADMIN — GENTAMICIN SULFATE 440 MG: 40 INJECTION, SOLUTION INTRAMUSCULAR; INTRAVENOUS at 20:52

## 2024-04-17 RX ADMIN — FENTANYL CITRATE 20 MCG: 50 INJECTION, SOLUTION INTRAMUSCULAR; INTRAVENOUS at 11:29

## 2024-04-17 RX ADMIN — INDOMETHACIN 25 MG: 25 CAPSULE ORAL at 22:38

## 2024-04-17 RX ADMIN — ZOLPIDEM TARTRATE 5 MG: 5 TABLET ORAL at 04:39

## 2024-04-17 RX ADMIN — MAGNESIUM SULFATE HEPTAHYDRATE 2 G/HR: 40 INJECTION, SOLUTION INTRAVENOUS at 13:03

## 2024-04-17 RX ADMIN — BUPIVACAINE HYDROCHLORIDE IN DEXTROSE 1.5 ML: 7.5 INJECTION, SOLUTION SUBARACHNOID at 11:29

## 2024-04-17 NOTE — ANESTHESIA PREPROCEDURE EVALUATION
Anesthesia Evaluation     Patient summary reviewed and Nursing notes reviewed   NPO Solid Status: > 8 hours  NPO Liquid Status: > 8 hours           Airway   Dental      Pulmonary - negative pulmonary ROS   Cardiovascular - negative cardio ROS        Neuro/Psych  (+) psychiatric history Anxiety and Depression  GI/Hepatic/Renal/Endo    (+) obesity, morbid obesity    Musculoskeletal (-) negative ROS    Abdominal    Substance History - negative use     OB/GYN    (+) Pregnant    Comment:   23 2/7 weeks presently, for Cerclage placement  Recurrent pregnancy loss  Previous C/S  Incompetent cervix  MTHFR      Other                    Anesthesia Plan    ASA 3     spinal       Anesthetic plan, risks, benefits, and alternatives have been provided, discussed and informed consent has been obtained with: patient.    CODE STATUS:    Level Of Support Discussed With: Patient  Code Status (Patient has no pulse and is not breathing): CPR (Attempt to Resuscitate)  Medical Interventions (Patient has pulse or is breathing): Full Support

## 2024-04-17 NOTE — ANESTHESIA PROCEDURE NOTES
Spinal Block      Patient reassessed immediately prior to procedure    Indication:procedure for pain  Performed By  CRNA/CAA: Alexandra Ryder CRNA  Preanesthetic Checklist  Completed: patient identified, IV checked, risks and benefits discussed, surgical consent, monitors and equipment checked, pre-op evaluation and timeout performed  Spinal Block Prep:  Patient Position:sitting  Sterile Tech:cap, gloves, mask and sterile barriers  Prep:Betadine  Patient Monitoring:blood pressure monitoring, continuous pulse oximetry and EKG    Spinal Block Procedure  Approach:midline  Location:L4-L5  Needle Type:Russell  Needle Gauge:25 G  Placement of Spinal needle event:cerebrospinal fluid aspirated  Paresthesia: no  Fluid Appearance:clear     Post Assessment  Patient Tolerance:patient tolerated the procedure well with no apparent complications  Complications no

## 2024-04-17 NOTE — ANESTHESIA POSTPROCEDURE EVALUATION
Patient: Ninfa Jackson    Procedure Summary       Date: 04/17/24 Room / Location: [facility] LABOR DELIVERY 1 /  [facility] LABOR DELIVERY    Anesthesia Start: 1104 Anesthesia Stop: 1217    Procedure: CERVICAL CERCLAGE (Cervix) Diagnosis:       Cervical insufficiency during pregnancy, antepartum, second trimester      (Cervical insufficiency)    Surgeons: Yaritza Echevarria MD Provider: Nito Crain DO    Anesthesia Type: spinal ASA Status: 3            Anesthesia Type: spinal    Vitals  Vitals Value Taken Time   /63 04/17/24 1209   Temp     Pulse 82 04/17/24 1217   Resp 16 04/17/24 1209   SpO2 97 % 04/17/24 1217   Vitals shown include unfiled device data.        Post Anesthesia Care and Evaluation    Patient location during evaluation: bedside  Patient participation: complete - patient participated  Level of consciousness: awake and alert  Pain score: 0  Pain management: adequate    Airway patency: patent  Anesthetic complications: No anesthetic complications    Cardiovascular status: acceptable  Respiratory status: acceptable  Hydration status: acceptable

## 2024-04-17 NOTE — OP NOTE
ROME Nazario      Pre-Operative Dx:   Cervical insufficiency   Failed ultrasound indicated cerclage          Postoperative dx:    1.  Same     Procedure: Procedure(s):  CERCLAGE REMOVAL   CERVICAL CERCLAGE     Surgeon/Assistant: Surgeon(s):  Critchfield, Agatha S, MD Thomson, Mary C, MD Douglas Milligan, MD          Anesthesia: Spinal         QBL:     Minimal          UOP: 200 mls.    I/O this shift:  In: 231.3 [I.V.:231.3]  Out: 601 [Urine:601]   Antibiotics: gentamycin (Garamycin)  Ampicillin   Clindamycin        Findings:   Cervix approximately 3 - 4 cms dilated with membranes bulging to the external os. Approximately 3cm CL anteriorly with approximately 1cm CL posteriorly and an old defect visible in the posterior cervix. Prior cerclage in the anterior lip only.   Knot tied at 12:00 with prolene loop        Procedure Details:  After obtaining informed consent the patient was taken to the operating room where adequate anesthesia was obtained. She was then placed in dorsal lithotomy position in candy cane stirrups. She was prepped and draped in a normal sterile fashion. A surgical time out was performed.  A weighted speculum was then placed. The bladder was drained for 200cc clear urine. The cervix was grasped gently in a circumferential manner with four ring forceps. The membranes were noted at the external os. A 60cc adair was placed in the cervix and insufflated with 20cc saline. The membranes retracted into the uterus.  An anterior and sidewall right angle retractor were placed. The cerclage was placed using 5mm Mirsilene tape moving counterclockwise around the cervix. Four throws were used. The knot was tied at 12:00 with a prolene loop under the left side of the knot.    All instruments were removed from the vagina. Surgical field noted to be hemostatic. Patient taken to recovery room in stable condition.          Complications:   None       Disposition:   Mother to recovery in stable condition            Yaritza Echevarria MD  4/17/2024  12:11 EDT

## 2024-04-18 ENCOUNTER — APPOINTMENT (OUTPATIENT)
Dept: WOMENS IMAGING | Facility: HOSPITAL | Age: 34
DRG: 819 | End: 2024-04-18
Payer: COMMERCIAL

## 2024-04-18 VITALS
WEIGHT: 276 LBS | TEMPERATURE: 98.2 F | HEIGHT: 67 IN | SYSTOLIC BLOOD PRESSURE: 116 MMHG | OXYGEN SATURATION: 97 % | BODY MASS INDEX: 43.32 KG/M2 | RESPIRATION RATE: 16 BRPM | DIASTOLIC BLOOD PRESSURE: 61 MMHG | HEART RATE: 94 BPM

## 2024-04-18 PROCEDURE — 25010000002 CLINDAMYCIN 900 MG/50ML SOLUTION: Performed by: OBSTETRICS & GYNECOLOGY

## 2024-04-18 PROCEDURE — 99231 SBSQ HOSP IP/OBS SF/LOW 25: CPT | Performed by: OBSTETRICS & GYNECOLOGY

## 2024-04-18 PROCEDURE — 63710000001 ONDANSETRON ODT 4 MG TABLET DISPERSIBLE: Performed by: OBSTETRICS & GYNECOLOGY

## 2024-04-18 PROCEDURE — 25810000003 SODIUM CHLORIDE 0.9 % SOLUTION 250 ML FLEX CONT: Performed by: OBSTETRICS & GYNECOLOGY

## 2024-04-18 PROCEDURE — 25010000002 AZITHROMYCIN PER 500 MG: Performed by: OBSTETRICS & GYNECOLOGY

## 2024-04-18 PROCEDURE — 25810000003 LACTATED RINGERS PER 1000 ML: Performed by: OBSTETRICS & GYNECOLOGY

## 2024-04-18 PROCEDURE — 25010000002 MAGNESIUM SULFATE 20 GM/500ML SOLUTION: Performed by: OBSTETRICS & GYNECOLOGY

## 2024-04-18 PROCEDURE — 99239 HOSP IP/OBS DSCHRG MGMT >30: CPT | Performed by: OBSTETRICS & GYNECOLOGY

## 2024-04-18 PROCEDURE — 25010000002 AMPICILLIN PER 500 MG: Performed by: OBSTETRICS & GYNECOLOGY

## 2024-04-18 PROCEDURE — 76815 OB US LIMITED FETUS(S): CPT

## 2024-04-18 PROCEDURE — 76815 OB US LIMITED FETUS(S): CPT | Performed by: OBSTETRICS & GYNECOLOGY

## 2024-04-18 RX ORDER — ZOLPIDEM TARTRATE 5 MG/1
5 TABLET ORAL ONCE
Status: COMPLETED | OUTPATIENT
Start: 2024-04-18 | End: 2024-04-18

## 2024-04-18 RX ADMIN — CLINDAMYCIN PHOSPHATE 900 MG: 900 INJECTION, SOLUTION INTRAVENOUS at 03:37

## 2024-04-18 RX ADMIN — ZOLPIDEM TARTRATE 5 MG: 5 TABLET ORAL at 00:32

## 2024-04-18 RX ADMIN — CITALOPRAM HYDROBROMIDE 40 MG: 40 TABLET ORAL at 17:55

## 2024-04-18 RX ADMIN — INDOMETHACIN 25 MG: 25 CAPSULE ORAL at 17:30

## 2024-04-18 RX ADMIN — AMPICILLIN SODIUM 2 G: 2 INJECTION, POWDER, FOR SOLUTION INTRAMUSCULAR; INTRAVENOUS at 00:32

## 2024-04-18 RX ADMIN — PRENATAL VITAMINS-IRON FUMARATE 27 MG IRON-FOLIC ACID 0.8 MG TABLET 1 TABLET: at 09:31

## 2024-04-18 RX ADMIN — LORAZEPAM 1 MG: 1 TABLET ORAL at 12:31

## 2024-04-18 RX ADMIN — AMPICILLIN SODIUM 2 G: 2 INJECTION, POWDER, FOR SOLUTION INTRAMUSCULAR; INTRAVENOUS at 12:31

## 2024-04-18 RX ADMIN — SODIUM CHLORIDE, POTASSIUM CHLORIDE, SODIUM LACTATE AND CALCIUM CHLORIDE 75 ML/HR: 600; 310; 30; 20 INJECTION, SOLUTION INTRAVENOUS at 00:32

## 2024-04-18 RX ADMIN — AZITHROMYCIN DIHYDRATE 500 MG: 500 INJECTION, POWDER, LYOPHILIZED, FOR SOLUTION INTRAVENOUS at 17:29

## 2024-04-18 RX ADMIN — ONDANSETRON 8 MG: 4 TABLET, ORALLY DISINTEGRATING ORAL at 17:55

## 2024-04-18 RX ADMIN — MAGNESIUM SULFATE HEPTAHYDRATE 2 G/HR: 40 INJECTION, SOLUTION INTRAVENOUS at 09:32

## 2024-04-18 RX ADMIN — INDOMETHACIN 25 MG: 25 CAPSULE ORAL at 05:40

## 2024-04-18 RX ADMIN — INDOMETHACIN 25 MG: 25 CAPSULE ORAL at 11:12

## 2024-04-18 RX ADMIN — DEXTROSE AND SODIUM CHLORIDE 75 ML/HR: 5; 200 INJECTION, SOLUTION INTRAVENOUS at 15:53

## 2024-04-18 RX ADMIN — CLINDAMYCIN PHOSPHATE 900 MG: 900 INJECTION, SOLUTION INTRAVENOUS at 11:13

## 2024-04-18 RX ADMIN — AMPICILLIN SODIUM 2 G: 2 INJECTION, POWDER, FOR SOLUTION INTRAMUSCULAR; INTRAVENOUS at 06:31

## 2024-04-18 NOTE — DISCHARGE SUMMARY
Admission date: 4/15/2024  Discharge date: 24  Transfer note.  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  Steroids given for fetal benefit, triple antibiotics, and magnesium sulfate administered.  No evidence of chorioamnionitis, labor, or abruption noted.  Patient underwent a cerclage removal and placement.  Patient continued on magnesium sulfate, antibiotics and Indocin.  Hospital day 3 patient complained of leaking of fluid.  Pre-PROM confirmed ( AmniSure and ferning).  Patient denies vaginal bleeding, abdominal pain, any other concerns.  Due to gestational age of 23 weeks 3 days patient be transferred to [facility] maternal-fetal medicine service Dr. Shearer excepted.        Vitals:    24 1531   BP: 130/78   Pulse: 93   Resp: 16   Temp:    SpO2: 97%       GENERAL:  Well-developed, well-nourished in no acute distress.   ABD:  Gravid uterus nontender    FHT's 150  EXTREMITIES:  No clubbing, cyanosis or edema.  PSYCHIATRIC:  Normal affect and mood.      Discharge condition: stable  Discharge diet   Dietary Orders (From admission, onward)       Start     Ordered     04/17/24 1307  Diet: Regular/House; Fluid Consistency: Thin (IDDSI 0)  Diet Effective Now        References:    Diet Order Crosswalk   Question Answer Comment   Diets: Regular/House    Fluid Consistency: Thin (IDDSI 0)        04/17/24 1307                  Additional Instructions: Call with fevers, uncontrolled nausea/vomiting/pain.  Medications:      Discharge Medications        ASK your doctor about these medications        Instructions Start Date   citalopram 40 MG tablet  Commonly known as: CeleXA   40 mg, Oral, Daily      Magnesium Oxide -Mg Supplement 400 (240 Mg) MG tablet   400 mg, Oral, 5 Times Daily      NIFEdipine 10 MG capsule  Commonly known as: PROCARDIA   10 mg, Oral, 3 Times Daily      ondansetron 4 MG/5ML solution  Commonly known as: ZOFRAN   4 mg, Oral, Once As Needed      prenatal vitamin 27-0.8 27-0.8 MG tablet tablet   Oral, Daily      Progesterone 200 MG capsule  Commonly known as: PROMETRIUM   200 mg, Oral, Daily, Mail order waiting to come in to start       vitamin D3 125 MCG (5000 UT) capsule capsule   5,000 Units, Oral, 2 Times Daily             Disposition transferred [facility] service  Follow up:     I spent over 30 minutes on discharge activity which included: face-to-face encounter counseling with the patient, reviewing the data in the system, coordination of the care with the nursing staff as well as consultants, documentation, and entering orders.      Aung Rajan DO  17:27 EDT

## 2024-04-18 NOTE — ANESTHESIA POSTPROCEDURE EVALUATION
Patient: Ninfa Jackson    Procedure Summary       Date: 04/17/24 Room / Location: [facility] LABOR DELIVERY 1 /  [facility] LABOR DELIVERY    Anesthesia Start: 1104 Anesthesia Stop: 1217    Procedure: CERVICAL CERCLAGE (Cervix) Diagnosis:       Cervical insufficiency during pregnancy, antepartum, second trimester      (Cervical insufficiency)    Surgeons: Yaritza Echevarria MD Provider: Nito Crain DO    Anesthesia Type: spinal ASA Status: 3            Anesthesia Type: spinal    Vitals  Vitals Value Taken Time   /60 04/18/24 0932   Temp 98.4 °F (36.9 °C) 04/18/24 0727   Pulse 89 04/18/24 0932   Resp 16 04/18/24 0932   SpO2 97 % 04/18/24 0932           Post Anesthesia Care and Evaluation    Patient location during evaluation: bedside  Patient participation: complete - patient participated  Level of consciousness: awake and alert  Pain management: adequate    Airway patency: patent  Anesthetic complications: No anesthetic complications    Cardiovascular status: acceptable  Respiratory status: acceptable  Hydration status: acceptable  Post Neuraxial Block status: Motor and sensory function returned to baseline and No signs or symptoms of PDPH

## 2024-04-18 NOTE — PROGRESS NOTES
"    Maternal/Fetal Medicine Antepartum Progress Note     Name: Ninfa Jackson    : 1990     MRN: [id number]     Referring Provider: Nelida Olmstead MD     #: 4    34 y.o.  23w3d POD1 s/p exam indicated cerclage revision   Did well overnight though this AM with concern for LOF after voiding and a couple small leaking sensations since that time. Amnisure positive though there was some blood. No abdominal pain, fever     SHIRIN: Estimated Date of Delivery: 24     ROS:   As noted in HPI.     Objective     Vital Signs  Temp:  [97.8 °F (36.6 °C)-99.5 °F (37.5 °C)] 98.2 °F (36.8 °C)   Temp src: Oral   BP: (102-137)/(59-80) 115/61   Heart Rate:  [] 90   Resp:  [16-18] 16   SpO2:  [95 %-99 %] 98 %       Device (Oxygen Therapy): room air           Flowsheet Rows      Flowsheet Row First Filed Value   Admission Height 170.2 cm (67\") Documented at 04/15/2024 1645   Admission Weight 125 kg (276 lb) Documented at 04/15/2024 1645            Intake/Output last 24 hours:      Intake/Output Summary (Last 24 hours) at 2024 1407  Last data filed at 2024 1314  Gross per 24 hour   Intake 3018.76 ml   Output 6300 ml   Net -3281.24 ml       Intake/Output this shift:    I/O this shift:  In: 837.5 [I.V.:837.5]  Out: 1900 [Urine:1900]      Exam     Resting comfortably   Abdomen gravid, nontender   : no active leaking. Nitrazine paper lightly positive   [place]: flat     Labs:     CBC:   Lab Results   Component Value Date    WBC 12.59 (H) 04/15/2024    HGB 13.4 04/15/2024    HCT 39.0 04/15/2024     04/15/2024       Imaging:     See Viewpoint. Normal fluid.        Medications (current):       ampicillin, 2 g, Intravenous, Q6H  citalopram, 40 mg, Oral, Nightly  clindamycin, 900 mg, Intravenous, Q8H  gentamicin, 5 mg/kg (Adjusted), Intravenous, Q24H  indomethacin, 25 mg, Oral, Q6H  prenatal vitamin 27-0.8, 1 tablet, Oral, Daily  Progesterone, 200 mg, Oral, Daily  sodium chloride, 10 mL, Intravenous, " Q12H         acetaminophen    bisacodyl    calcium carbonate    docusate sodium    famotidine    hydrOXYzine    lidocaine PF 1%    LORazepam    ondansetron ODT **OR** ondansetron    Pharmacy to Dose gentamicin (GARAMYCIN)    sodium chloride    sodium chloride      Assessment and Plan     Ninfa Jackson is a 34 y.o.  23w3d with cervical insufficiency s/p exam indicated cerclage revision   Overall stable   Concern for LOF - suspect leaking urine due to very normal MVP on ultrasound but will continue to monitor   Continue antibiotics, Mag, indocin as written   Will d/c tomorrow if no further evidence of PPROM           I spent 20 minutes caring for the patient on the day of service. This included: obtaining or reviewing a separately obtained medical history, reviewing patient records, performing a medically appropriate exam and/or evaluation, counseling or educating the patient/family/caregiver, ordering medications, labs, and/or procedures and documenting such in the medical record. This does not include time spent on review and interpretation of other tests such as fetal ultrasound or the performance of other procedures such as amniocentesis or CVS.    Yaritza Echevarria MD FACOG  Maternal Fetal Medicine, [facility]     2024

## 2024-04-18 NOTE — PROGRESS NOTES
[facility]ist    Called to evaluate for possible rupture membranes.  Patient denies any pain or vaginal bleeding.  Fluid obtained ferning positive  Latency antibiotics initiated.  Patient currently on magnesium sulfate and Indocin.  Discussed with patient options for continued hospitalization.  Discussed with Dr. Echevarria findings..  Patient requested to be transferred to  for definitive treatment.  Due to gestational age.  I discussed with Dr. Manfred SANTANA at [facility]. she accepted transfer.

## 2024-04-23 LAB — POC AMNISURE: POSITIVE

## 2024-04-23 PROCEDURE — 84112 EVAL AMNIOTIC FLUID PROTEIN: CPT | Performed by: OBSTETRICS & GYNECOLOGY

## (undated) DEVICE — LOU D & C HYSTEROSCOPY: Brand: MEDLINE INDUSTRIES, INC.

## (undated) DEVICE — GLOVE,SURG,SENSICARE,ALOE,LF,PF,6: Brand: MEDLINE

## (undated) DEVICE — MAT PREVALON MOBL TRANSFR AIR WO/PAD 39X80IN

## (undated) DEVICE — TRY SKINPREP DRYPREP

## (undated) DEVICE — PREP SOL POVIDONE/IODINE BT 4OZ

## (undated) DEVICE — SKIN PREP TRAY W/CHG: Brand: MEDLINE INDUSTRIES, INC.

## (undated) DEVICE — LEX VAG DELIVERY PACK: Brand: MEDLINE INDUSTRIES, INC.

## (undated) DEVICE — GOWN,REINF,POLY,ECL,PP SLV,XL: Brand: MEDLINE

## (undated) DEVICE — SUT MERSLN 5MM MO4 12IN WHT RS23

## (undated) DEVICE — GLV SURG BIOGEL LTX PF 8

## (undated) DEVICE — MEDI-VAC YANKAUER SUCTION HANDLE W/BULBOUS TIP: Brand: CARDINAL HEALTH

## (undated) DEVICE — TRY SPINE BLCK WHITACRE 25G 3X5IN

## (undated) DEVICE — STRAP STIRUP SLP RNG 19X3.5IN DISP

## (undated) DEVICE — TUBING, SUCTION, 1/4" X 20', STRAIGHT: Brand: MEDLINE INDUSTRIES, INC.

## (undated) DEVICE — GLV SURG SENSICARE PI LF PF 7.5 GRN STRL

## (undated) DEVICE — SOL IRR NACL 0.9PCT BT 1000ML

## (undated) DEVICE — GLV SURG BIOGEL LTX PF 6 1/2

## (undated) DEVICE — STRAP STIRUP WO/ RNG

## (undated) DEVICE — CATHETER,URETHRAL,VINYL,MALE,16",16 FR: Brand: MEDLINE